# Patient Record
Sex: MALE | Race: WHITE | Employment: FULL TIME | URBAN - NONMETROPOLITAN AREA
[De-identification: names, ages, dates, MRNs, and addresses within clinical notes are randomized per-mention and may not be internally consistent; named-entity substitution may affect disease eponyms.]

---

## 2020-06-05 ENCOUNTER — HOSPITAL ENCOUNTER (INPATIENT)
Age: 70
LOS: 14 days | Discharge: HOME | DRG: 885 | End: 2020-06-19
Attending: PSYCHIATRY & NEUROLOGY | Admitting: PSYCHIATRY & NEUROLOGY
Payer: MEDICARE

## 2020-06-05 PROBLEM — F33.9 MDD (MAJOR DEPRESSIVE DISORDER), RECURRENT EPISODE, WITH ATYPICAL FEATURES (HCC): Status: ACTIVE | Noted: 2020-06-05

## 2020-06-05 LAB
ACETAMINOPHEN LEVEL: < 5 UG/ML (ref 0–20)
ALBUMIN SERPL-MCNC: 4.6 G/DL (ref 3.5–5.1)
ALP BLD-CCNC: 65 U/L (ref 38–126)
ALT SERPL-CCNC: 25 U/L (ref 11–66)
AMPHETAMINE+METHAMPHETAMINE URINE SCREEN: POSITIVE
ANION GAP SERPL CALCULATED.3IONS-SCNC: 11 MEQ/L (ref 8–16)
AST SERPL-CCNC: 20 U/L (ref 5–40)
BARBITURATE QUANTITATIVE URINE: NEGATIVE
BASOPHILS # BLD: 0.2 %
BASOPHILS ABSOLUTE: 0 THOU/MM3 (ref 0–0.1)
BENZODIAZEPINE QUANTITATIVE URINE: POSITIVE
BILIRUB SERPL-MCNC: 0.3 MG/DL (ref 0.3–1.2)
BUN BLDV-MCNC: 16 MG/DL (ref 7–22)
CALCIUM SERPL-MCNC: 9.9 MG/DL (ref 8.5–10.5)
CANNABINOID QUANTITATIVE URINE: NEGATIVE
CARBAMAZEPINE, TOTAL: < 2 MCG/ML (ref 2–10)
CHLORIDE BLD-SCNC: 102 MEQ/L (ref 98–111)
CO2: 25 MEQ/L (ref 23–33)
COCAINE METABOLITE QUANTITATIVE URINE: NEGATIVE
CREAT SERPL-MCNC: 0.9 MG/DL (ref 0.4–1.2)
EKG ATRIAL RATE: 79 BPM
EKG P AXIS: 61 DEGREES
EKG P-R INTERVAL: 174 MS
EKG Q-T INTERVAL: 362 MS
EKG QRS DURATION: 86 MS
EKG QTC CALCULATION (BAZETT): 415 MS
EKG R AXIS: 48 DEGREES
EKG T AXIS: 59 DEGREES
EKG VENTRICULAR RATE: 79 BPM
EOSINOPHIL # BLD: 0.5 %
EOSINOPHILS ABSOLUTE: 0 THOU/MM3 (ref 0–0.4)
ERYTHROCYTE [DISTWIDTH] IN BLOOD BY AUTOMATED COUNT: 13.1 % (ref 11.5–14.5)
ERYTHROCYTE [DISTWIDTH] IN BLOOD BY AUTOMATED COUNT: 44.1 FL (ref 35–45)
ETHYL ALCOHOL, SERUM: < 0.01 %
GFR SERPL CREATININE-BSD FRML MDRD: 84 ML/MIN/1.73M2
GLUCOSE BLD-MCNC: 119 MG/DL (ref 70–108)
HCT VFR BLD CALC: 50.6 % (ref 42–52)
HEMOGLOBIN: 16.7 GM/DL (ref 14–18)
IMMATURE GRANS (ABS): 0.01 THOU/MM3 (ref 0–0.07)
IMMATURE GRANULOCYTES: 0.2 %
LITHIUM LEVEL: < 0.05 MMOL/L (ref 0.6–1.2)
LYMPHOCYTES # BLD: 13 %
LYMPHOCYTES ABSOLUTE: 0.8 THOU/MM3 (ref 1–4.8)
MCH RBC QN AUTO: 30.3 PG (ref 26–33)
MCHC RBC AUTO-ENTMCNC: 33 GM/DL (ref 32.2–35.5)
MCV RBC AUTO: 91.8 FL (ref 80–94)
MONOCYTES # BLD: 10.8 %
MONOCYTES ABSOLUTE: 0.7 THOU/MM3 (ref 0.4–1.3)
NUCLEATED RED BLOOD CELLS: 0 /100 WBC
OPIATES, URINE: NEGATIVE
OSMOLALITY CALCULATION: 278 MOSMOL/KG (ref 275–300)
OXYCODONE: NEGATIVE
PHENCYCLIDINE QUANTITATIVE URINE: NEGATIVE
PLATELET # BLD: 283 THOU/MM3 (ref 130–400)
PMV BLD AUTO: 8.2 FL (ref 9.4–12.4)
POTASSIUM REFLEX MAGNESIUM: 4.2 MEQ/L (ref 3.5–5.2)
RBC # BLD: 5.51 MILL/MM3 (ref 4.7–6.1)
SALICYLATE, SERUM: < 0.3 MG/DL (ref 2–10)
SEG NEUTROPHILS: 75.3 %
SEGMENTED NEUTROPHILS ABSOLUTE COUNT: 4.7 THOU/MM3 (ref 1.8–7.7)
SODIUM BLD-SCNC: 138 MEQ/L (ref 135–145)
T4 FREE: 1.53 NG/DL (ref 0.93–1.76)
TOTAL PROTEIN: 7.2 G/DL (ref 6.1–8)
TROPONIN T: < 0.01 NG/ML
TSH SERPL DL<=0.05 MIU/L-ACNC: 0.63 UIU/ML (ref 0.4–4.2)
VALPROIC ACID LEVEL: < 2.8 UG/ML (ref 50–100)
WBC # BLD: 6.3 THOU/MM3 (ref 4.8–10.8)

## 2020-06-05 PROCEDURE — 84484 ASSAY OF TROPONIN QUANT: CPT

## 2020-06-05 PROCEDURE — 93005 ELECTROCARDIOGRAM TRACING: CPT | Performed by: NURSE PRACTITIONER

## 2020-06-05 PROCEDURE — G0480 DRUG TEST DEF 1-7 CLASSES: HCPCS

## 2020-06-05 PROCEDURE — 80053 COMPREHEN METABOLIC PANEL: CPT

## 2020-06-05 PROCEDURE — 85025 COMPLETE CBC W/AUTO DIFF WBC: CPT

## 2020-06-05 PROCEDURE — 6370000000 HC RX 637 (ALT 250 FOR IP): Performed by: PSYCHIATRY & NEUROLOGY

## 2020-06-05 PROCEDURE — 1240000000 HC EMOTIONAL WELLNESS R&B

## 2020-06-05 PROCEDURE — 80156 ASSAY CARBAMAZEPINE TOTAL: CPT

## 2020-06-05 PROCEDURE — 99284 EMERGENCY DEPT VISIT MOD MDM: CPT

## 2020-06-05 PROCEDURE — 80178 ASSAY OF LITHIUM: CPT

## 2020-06-05 PROCEDURE — 84443 ASSAY THYROID STIM HORMONE: CPT

## 2020-06-05 PROCEDURE — 80164 ASSAY DIPROPYLACETIC ACD TOT: CPT

## 2020-06-05 PROCEDURE — 36415 COLL VENOUS BLD VENIPUNCTURE: CPT

## 2020-06-05 PROCEDURE — 80307 DRUG TEST PRSMV CHEM ANLYZR: CPT

## 2020-06-05 PROCEDURE — 84439 ASSAY OF FREE THYROXINE: CPT

## 2020-06-05 RX ORDER — DEXTROAMPHETAMINE SACCHARATE, AMPHETAMINE ASPARTATE, DEXTROAMPHETAMINE SULFATE AND AMPHETAMINE SULFATE 2.5; 2.5; 2.5; 2.5 MG/1; MG/1; MG/1; MG/1
10 TABLET ORAL EVERY MORNING
Status: ON HOLD | COMMUNITY
End: 2020-06-19 | Stop reason: HOSPADM

## 2020-06-05 RX ORDER — ALPRAZOLAM 0.5 MG/1
TABLET ORAL 3 TIMES DAILY PRN
Status: ON HOLD | COMMUNITY
End: 2020-06-19 | Stop reason: SDUPTHER

## 2020-06-05 RX ORDER — TRAZODONE HYDROCHLORIDE 50 MG/1
50 TABLET ORAL NIGHTLY PRN
Status: DISCONTINUED | OUTPATIENT
Start: 2020-06-05 | End: 2020-06-08

## 2020-06-05 RX ORDER — ACETAMINOPHEN 325 MG/1
650 TABLET ORAL EVERY 4 HOURS PRN
Status: DISCONTINUED | OUTPATIENT
Start: 2020-06-05 | End: 2020-06-05

## 2020-06-05 RX ORDER — MAGNESIUM HYDROXIDE/ALUMINUM HYDROXICE/SIMETHICONE 120; 1200; 1200 MG/30ML; MG/30ML; MG/30ML
30 SUSPENSION ORAL EVERY 6 HOURS PRN
Status: DISCONTINUED | OUTPATIENT
Start: 2020-06-05 | End: 2020-06-19 | Stop reason: HOSPADM

## 2020-06-05 RX ORDER — IBUPROFEN 400 MG/1
400 TABLET ORAL EVERY 6 HOURS PRN
Status: DISCONTINUED | OUTPATIENT
Start: 2020-06-05 | End: 2020-06-19 | Stop reason: HOSPADM

## 2020-06-05 RX ORDER — ACETAMINOPHEN 325 MG/1
650 TABLET ORAL EVERY 4 HOURS PRN
Status: DISCONTINUED | OUTPATIENT
Start: 2020-06-05 | End: 2020-06-19 | Stop reason: HOSPADM

## 2020-06-05 RX ORDER — LISINOPRIL 20 MG/1
20 TABLET ORAL DAILY
Status: DISCONTINUED | OUTPATIENT
Start: 2020-06-06 | End: 2020-06-19 | Stop reason: HOSPADM

## 2020-06-05 RX ORDER — LISINOPRIL 20 MG/1
20 TABLET ORAL DAILY
COMMUNITY

## 2020-06-05 RX ORDER — ALPRAZOLAM 0.5 MG/1
0.5 TABLET ORAL 3 TIMES DAILY PRN
Status: DISCONTINUED | OUTPATIENT
Start: 2020-06-05 | End: 2020-06-19 | Stop reason: HOSPADM

## 2020-06-05 RX ORDER — ALPRAZOLAM 0.25 MG/1
0.25 TABLET ORAL ONCE
Status: COMPLETED | OUTPATIENT
Start: 2020-06-05 | End: 2020-06-05

## 2020-06-05 RX ORDER — IBUPROFEN 200 MG
400 TABLET ORAL EVERY 6 HOURS PRN
Status: DISCONTINUED | OUTPATIENT
Start: 2020-06-05 | End: 2020-06-05

## 2020-06-05 RX ORDER — DEXTROAMPHETAMINE SACCHARATE, AMPHETAMINE ASPARTATE, DEXTROAMPHETAMINE SULFATE AND AMPHETAMINE SULFATE 5; 5; 5; 5 MG/1; MG/1; MG/1; MG/1
20 TABLET ORAL
Status: ON HOLD | COMMUNITY
End: 2020-06-19 | Stop reason: HOSPADM

## 2020-06-05 RX ORDER — POLYETHYLENE GLYCOL 3350 17 G/17G
17 POWDER, FOR SOLUTION ORAL DAILY PRN
Status: DISCONTINUED | OUTPATIENT
Start: 2020-06-05 | End: 2020-06-19 | Stop reason: HOSPADM

## 2020-06-05 RX ORDER — HYDROXYZINE HYDROCHLORIDE 25 MG/1
50 TABLET, FILM COATED ORAL 3 TIMES DAILY PRN
Status: DISCONTINUED | OUTPATIENT
Start: 2020-06-05 | End: 2020-06-19 | Stop reason: HOSPADM

## 2020-06-05 RX ADMIN — ALPRAZOLAM 0.5 MG: 0.5 TABLET ORAL at 22:09

## 2020-06-05 RX ADMIN — ALPRAZOLAM 0.25 MG: 0.25 TABLET ORAL at 19:57

## 2020-06-05 RX ADMIN — TRAZODONE HYDROCHLORIDE 50 MG: 50 TABLET ORAL at 22:09

## 2020-06-05 ASSESSMENT — ENCOUNTER SYMPTOMS
CHEST TIGHTNESS: 0
PHOTOPHOBIA: 0
DIARRHEA: 0
SINUS PAIN: 0
APNEA: 0
RHINORRHEA: 0
CONSTIPATION: 0
SINUS PRESSURE: 0
TROUBLE SWALLOWING: 0
FACIAL SWELLING: 0
COUGH: 0
COLOR CHANGE: 0
VOMITING: 0
BACK PAIN: 0
SORE THROAT: 0
SHORTNESS OF BREATH: 0
WHEEZING: 0
ABDOMINAL PAIN: 0
NAUSEA: 0
ABDOMINAL DISTENTION: 0

## 2020-06-05 ASSESSMENT — SLEEP AND FATIGUE QUESTIONNAIRES
DIFFICULTY STAYING ASLEEP: YES
RESTFUL SLEEP: NO
AVERAGE NUMBER OF SLEEP HOURS: 2
DO YOU USE A SLEEP AID: NO
DO YOU HAVE DIFFICULTY SLEEPING: YES
DO YOU HAVE DIFFICULTY SLEEPING: YES
DIFFICULTY ARISING: NO
DO YOU USE A SLEEP AID: NO
DIFFICULTY FALLING ASLEEP: YES

## 2020-06-05 ASSESSMENT — LIFESTYLE VARIABLES: HISTORY_ALCOHOL_USE: YES

## 2020-06-05 ASSESSMENT — PATIENT HEALTH QUESTIONNAIRE - PHQ9: SUM OF ALL RESPONSES TO PHQ QUESTIONS 1-9: 20

## 2020-06-05 NOTE — ED NOTES
Pt to 4E via wheelchair with [place] police     Keely Sharpe [facility]  06/05/20 7987

## 2020-06-05 NOTE — ED PROVIDER NOTES
[address]          CHIEF COMPLAINT       Chief Complaint   Patient presents with    Depression       Nurses Notes reviewed and I agree except as noted in the HPI. HISTORY OF PRESENT ILLNESS    Remi Mancilla is a 81 y.o. male who presents to the Emergency Department for the evaluation of severe depression. He arrives to the emergency department voluntarily accompanied by his wife. He is experiencing ongoing depression for the past 7 months. He denies thoughts of suicide or homicide however feels overwhelmingly depressed. .  States that he would never consider suicide due to the fact that it would have on his family. States he has been coping with depression for over 20 years however is normal able to rationalize through it and \"take it\". Patient has been evaluated via telehealth by Dr. Felipe Neff, medications have been prescribed however he states that they are not helping with his depression. Patient reports that yesterday evening he did have a panic attack that was so severe that he had to be restrained by his family members and this was very concerning to him and them. Depression is affecting everyday life, family life as well as patient is voicing concerns for effects on his work as he is a heavy . The HPI was provided by the patient. REVIEW OF SYSTEMS     Review of Systems   Constitutional: Negative for chills, diaphoresis, fatigue and fever. HENT: Negative for congestion, ear pain, facial swelling, rhinorrhea, sinus pressure, sinus pain, sneezing, sore throat and trouble swallowing. Eyes: Negative for photophobia. Respiratory: Negative for apnea, cough, chest tightness, shortness of breath and wheezing. Cardiovascular: Positive for palpitations. Negative for chest pain. Gastrointestinal: Negative for abdominal distention, abdominal pain, constipation, diarrhea, nausea and vomiting.    Endocrine: Negative for polydipsia, polyphagia and polyuria. Genitourinary: Negative for decreased urine volume, dysuria, flank pain, frequency and urgency. Musculoskeletal: Negative for arthralgias, back pain, joint swelling, myalgias, neck pain and neck stiffness. Skin: Negative for color change and wound. Neurological: Negative for dizziness, tremors, weakness, light-headedness, numbness and headaches. Psychiatric/Behavioral: Positive for sleep disturbance. Negative for confusion, decreased concentration, hallucinations, self-injury and suicidal ideas. The patient is nervous/anxious. PAST MEDICAL HISTORY    has a past medical history of Anxiety, Depression, Hypertension, Low testosterone, and Psychiatric problem. SURGICAL HISTORY      has a past surgical history that includes Appendectomy; Knee arthroscopy (Left); and hernia repair. CURRENT MEDICATIONS       Current Discharge Medication List      CONTINUE these medications which have NOT CHANGED    Details   amphetamine-dextroamphetamine (ADDERALL) 10 MG tablet Take 10 mg by mouth every morning. amphetamine-dextroamphetamine (ADDERALL) 20 MG tablet Take 20 mg by mouth Daily with lunch. lisinopril (PRINIVIL;ZESTRIL) 20 MG tablet Take 20 mg by mouth daily      ALPRAZolam (XANAX) 0.5 MG tablet Take by mouth 3 times daily as needed for Anxiety. Unsure of dose      Testosterone Cypionate (TESTONE CIK IM) Inject into the muscle every 30 days Unsure of dose. ALLERGIES     is allergic to 5-alpha reductase inhibitors. FAMILY HISTORY     He indicated that his mother is . He indicated that his father is . He indicated that the status of his paternal grandmother is unknown. He indicated that the status of his paternal aunt is unknown. He indicated that the status of his paternal uncle is unknown. He indicated that the status of his paternal cousin is unknown.   family history includes Alzheimer's Disease in his mother;  Anxiety Disorder the radiologist.    No orders to display       LABS:     Labs Reviewed   CBC WITH AUTO DIFFERENTIAL - Abnormal; Notable for the following components:       Result Value    MPV 8.2 (*)     Lymphocytes Absolute 0.8 (*)     All other components within normal limits   COMPREHENSIVE METABOLIC PANEL W/ REFLEX TO MG FOR LOW K - Abnormal; Notable for the following components:    Glucose 119 (*)     All other components within normal limits   SALICYLATE LEVEL - Abnormal; Notable for the following components:    Salicylate, Serum < 0.3 (*)     All other components within normal limits   GLOMERULAR FILTRATION RATE, ESTIMATED - Abnormal; Notable for the following components:    Est, Glom Filt Rate 84 (*)     All other components within normal limits   LITHIUM LEVEL - Abnormal; Notable for the following components:    Lithium Lvl < 0.05 (*)     All other components within normal limits   VALPROIC ACID LEVEL, TOTAL - Abnormal; Notable for the following components:    Valproic Acid Lvl < 2.8 (*)     All other components within normal limits   LITHIUM LEVEL - Abnormal; Notable for the following components:    Lithium Lvl 0.42 (*)     All other components within normal limits   ACETAMINOPHEN LEVEL   ETHANOL   URINE DRUG SCREEN   TROPONIN   ANION GAP   OSMOLALITY   TSH WITHOUT REFLEX   T4, FREE   CARBAMAZEPINE LEVEL, TOTAL       EMERGENCY DEPARTMENT COURSE:   Vitals:    Vitals:    06/11/20 1748 06/11/20 2000 06/12/20 0755 06/12/20 0940   BP: 111/64 117/61 100/62 125/65   Pulse: 69 72 60 61   Resp: 16 16 16    Temp: 97.8 °F (36.6 °C) 96.8 °F (36 °C) 96 °F (35.6 °C)    TempSrc: Oral Oral Oral    SpO2: 96% 98% 98%    Weight:       Height:           5:21 PM EDT: The patient was seen and evaluated. MDM:  Patient seen and evaluated for depression without suicidal or homicidal ideation. Patient is here voluntarily. Appropriate labs were ordered for medical clearance.   EKG completed due to patient complaining of \"heart pounding\"

## 2020-06-05 NOTE — PROGRESS NOTES
Provisional Diagnosis:   Major Depressive Disorder per patient     Risk, Psychosocial and Contextual Factors:  Anxiety    Current  Treatment: Dr. Tammy Jauregui at [facility]      Present Suicidal Behavior:      Verbal: Denied        Attempt: Denied    Access to Weapons:  Patient's wife states guns are secured. Current Suicide Risk: Low, Moderate or High:  Low      Past Suicidal Behavior:       Verbal: Denied    Attempt: None noted    Self-Injurious/Self-Mutilation: None noted    Traumatic Event Within Past 2 Weeks:   Denied    Current Abuse: None noted    Legal: Denied    Violence: Denied    Protective Factors:  Patient's support system    Housing:   Lives with his wife     CPAP/Oxygen/Ambulation Difficulties: Denied    Basic Vital Signs Normal?: Check with Patients Nurse prior to Calling Psychiatry    Critical Labs?: Check with Patients Nurse prior to Calling Psychiatry    Clinical Summary:      Patient is a 81year old male who presents to the ED voluntarily. Patient is accompanied by his wife Omayra Briscoe. Patient states he has been a client of Dr. Tammy Jauregui since this year. Patient states he has not been sleeping for months and had panic attacks the past two days. Patient states his heart was racing and his family had to hold him down last night. Patient states he has lost 24 pounds since November. Patient states feeling anxious, nervous, racing thoughts, can't concentrate and unable to complete tasks he used to complete such as driving. Suicidal and Homicidal thoughts and/or plans denied. No delusions noted. Hallucinations denied. AOD denied. Omayra Briscoe did report that the patient did make a comment such as \"If there would of been a gun. .. \". Patient denied any intention of wanting to commit suicide. Patient states he stopped the psychotropics Dr. Tammy Jauregui prescribed two months due to no relief. Patient states he does not know what he will do if discharged. Patient was tearful but cooperative.     Level of Care Disposition:

## 2020-06-06 PROBLEM — F33.2 MDD (MAJOR DEPRESSIVE DISORDER), RECURRENT SEVERE, WITHOUT PSYCHOSIS (HCC): Chronic | Status: ACTIVE | Noted: 2020-06-05

## 2020-06-06 PROBLEM — F42.9 OCD (OBSESSIVE COMPULSIVE DISORDER): Chronic | Status: ACTIVE | Noted: 2020-06-06

## 2020-06-06 PROBLEM — F41.0 PANIC DISORDER WITHOUT AGORAPHOBIA: Chronic | Status: ACTIVE | Noted: 2020-06-06

## 2020-06-06 PROCEDURE — 6370000000 HC RX 637 (ALT 250 FOR IP): Performed by: PSYCHIATRY & NEUROLOGY

## 2020-06-06 PROCEDURE — 1240000000 HC EMOTIONAL WELLNESS R&B

## 2020-06-06 RX ORDER — FLUVOXAMINE MALEATE 50 MG/1
50 TABLET, COATED ORAL NIGHTLY
Status: DISCONTINUED | OUTPATIENT
Start: 2020-06-06 | End: 2020-06-08

## 2020-06-06 RX ORDER — LITHIUM CARBONATE 300 MG/1
300 TABLET, FILM COATED, EXTENDED RELEASE ORAL EVERY 12 HOURS SCHEDULED
Status: DISCONTINUED | OUTPATIENT
Start: 2020-06-06 | End: 2020-06-19 | Stop reason: HOSPADM

## 2020-06-06 RX ADMIN — FLUVOXAMINE MALEATE 50 MG: 50 TABLET, COATED ORAL at 22:11

## 2020-06-06 RX ADMIN — LITHIUM CARBONATE 300 MG: 300 TABLET, EXTENDED RELEASE ORAL at 12:05

## 2020-06-06 RX ADMIN — LITHIUM CARBONATE 300 MG: 300 TABLET, EXTENDED RELEASE ORAL at 22:11

## 2020-06-06 RX ADMIN — TRAZODONE HYDROCHLORIDE 50 MG: 50 TABLET ORAL at 22:12

## 2020-06-06 RX ADMIN — LISINOPRIL 20 MG: 20 TABLET ORAL at 08:32

## 2020-06-06 RX ADMIN — ALPRAZOLAM 0.5 MG: 0.5 TABLET ORAL at 20:02

## 2020-06-06 ASSESSMENT — SLEEP AND FATIGUE QUESTIONNAIRES
DIFFICULTY STAYING ASLEEP: YES
DO YOU HAVE DIFFICULTY SLEEPING: YES
DIFFICULTY ARISING: NO
DIFFICULTY FALLING ASLEEP: YES
RESTFUL SLEEP: NO
DO YOU USE A SLEEP AID: NO
SLEEP PATTERN: DIFFICULTY FALLING ASLEEP;DISTURBED/INTERRUPTED SLEEP;RESTLESSNESS;INSOMNIA

## 2020-06-06 ASSESSMENT — PATIENT HEALTH QUESTIONNAIRE - PHQ9: SUM OF ALL RESPONSES TO PHQ QUESTIONS 1-9: 20

## 2020-06-06 ASSESSMENT — LIFESTYLE VARIABLES: HISTORY_ALCOHOL_USE: YES

## 2020-06-06 NOTE — PLAN OF CARE
Problem: Altered Mood, Depressive Behavior:  Goal: Able to verbalize and/or display a decrease in depressive symptoms  Description: Able to verbalize and/or display a decrease in depressive symptoms  Outcome: Ongoing  Note: Patient able to verbalize and display a decrease in depressive symptoms   Goal: Absence of self-harm  Description: Absence of self-harm  Outcome: Ongoing  Note: No self harm behaviors were observed or reported so far this shift. Remains on every 15 minutes precautions for safety. Goal: Patient specific goal  Description: Patient specific goal  Outcome: Ongoing  Note: Patient stated specific  goal   Goal: Participates in care planning  Description: Participates in care planning  Outcome: Ongoing  Note: Patient did participate in care planning      Problem: Altered Mood, Psychotic Behavior:  Goal: Able to verbalize reality based thinking  Description: Able to verbalize reality based thinking  Outcome: Ongoing  Note: Patient able to verbalize reality based thinking      Problem: Anxiety:  Goal: Level of anxiety will decrease  Description: Level of anxiety will decrease  Outcome: Ongoing  Note: Patient level of anxiety will decrease with medication      Problem: Activity:  Goal: Sleeping patterns will improve  Description: Sleeping patterns will improve  Outcome: Ongoing  Note: Patient sleeping pattern will improve with medication      Problem: Discharge Planning:  Goal: Discharged to appropriate level of care  Description: Discharged to appropriate level of care  Outcome: Ongoing  Note: Discharge planner working with patient to achieve optimal discharge plan, specific to the needs of this patient.        Problem: KNOWLEDGE DEFICIT,EDUCATION,DISCHARGE PLAN  Goal: Knowledge - personal safety  Outcome: Ongoing  Note: Patient did  not complete personal safety plan this shift      Problem: General Medical Problem-Behavioral Health  Goal: Vital signs within specified parameters  Outcome: Ongoing  Note:

## 2020-06-06 NOTE — PROGRESS NOTES
Behavioral Health   Admission Note     Admission Type:   Admission Type: Voluntary    Reason for admission:  Reason for Admission: \"I struggled with this anxiety for 7 months, nothing helps, I am going nowhere no matter what. \" \"I cannot sleep or enjoy my grandkids, I am scared to be around people or to do anything. \"    PATIENT STRENGTHS:  Strengths: Communication, Connection to output provider, Employment, Positive Support    Patient Strengths and Limitations:  Limitations: Difficulty problem solving/relies on others to help solve problems, Tendency to isolate self, Hopeless about future, General negative or hopeless attitude about future/recovery    Addictive Behavior:   Addictive Behavior  In the past 3 months, have you felt or has someone told you that you have a problem with:  : None  Do you have a history of Chemical Use?: No  Do you have a history of Alcohol Use?: Yes  Do you have a history of Street Drug Abuse?: No  Histroy of Prescripton Drug Abuse?: No    Medical Problems:   Past Medical History:   Diagnosis Date    Anxiety     Depression     Hypertension     Low testosterone     Psychiatric problem        Status EXAM:  Status and Exam  Normal: No  Facial Expression: Worried, Sad  Affect: Unstable  Level of Consciousness: Alert  Mood:Normal: No  Mood: Depressed, Anxious, Labile, Helpless, Sad, Despair, Ashamed/humiliated  Motor Activity:Normal: No  Motor Activity: Increased(pacing)  Interview Behavior: Cooperative  Preception: Anton to Person, Anton to Time, Anton to Place, Anton to Situation  Attention:Normal: No  Attention: Unable to Concentrate  Thought Processes: Flt.of Ideas, Tangential  Thought Content:Normal: No  Thought Content: Obsessions, Phobias, Preoccupations  Hallucinations: None  Delusions: Yes  Delusions: Obsessions, Other(See Comment)(somatic, fearful of being around people)  Memory:Normal: No  Memory: Poor Recent  Insight and Judgment: No  Insight and Judgment: Poor Judgment, Poor Insight, Unrealistic  Present Suicidal Ideation: No  Present Homicidal Ideation: No    Pt admitted with followings belongings:  Dentures: Partials  Vision - Corrective Lenses: Glasses  Hearing Aid: None  Jewelry: None  Body Piercings Removed: N/A  Clothing: Socks  Were All Patient Medications Collected?: Not Applicable  Other Valuables: None     Admission order obtained yes. Valuables sent home with no one in my presence. Valuables placed in safe in security envelope, number:  n/a. Patient's home medications were n/a. Patient oriented to surroundings and program expectations and copy of patient rights given. Received admission packet:  yes. Consents reviewed, signed yes. \"An Important Message from [employer] About Your Rights\" form reviewed, signed yes. Refused no. Patient verbalize understanding:  yes. Patient education on precautions: yes           Patient screened positive for suicide risk on CSSR-S (\"yes\" to question #4, 5, OR 6)  no. Physician notified of risk score. Orders received n/a . Explained patients right to have family, representative or physician notified of their admission. Patient has Requested for physician to be notified. Patient has Declined for family/representative to be notified. Patient on unit when writer arrived. Patient has reportedly been having panic attacks at home and not sleeping. Patient reports that for 7 months he has been anxious, depressed, not sleeping at all, and not able to work, drive or enjoy life and his grandchildren. Patient also reports that he is scared to be around people and he isolates. He is unable to give any specific trigger or stressor that has caused this but does say it happened to him 35 years ago and he was treated here by Dr. Drake Whitney. Patient also admits that he has not been taking his medications as Dr. Alexandro Prieto has prescribed for at least 2 weeks but has been taking adderall that his family Dr. Severo Pilar.  Wife verified medications per phone and

## 2020-06-06 NOTE — BH NOTE
INPATIENT RECREATIONAL THERAPY  ADULT BEHAVIORAL SERVICES  EVALUATION    REFERRING PHYSICIAN:  Dr. Isac Walker  DIAGNOSIS:   Major Depressive Disorder   PRECAUTIONS:  Standard Precautions   HISTORY OF PRESENT ILLNESS/INJURY: Pt admitted to the unit voluntarily due \"dealing something that happened 35 years ago\", and that it is coming back to haunt him. Pt reports he has positive support with family, but that he has been having increased anxiety and has been isolating. He reports that he cannot enjoy being around his grandchildren or participating in any recreational activities. Pt reports that he has had racing thoughts, has difficulty concentrating, and is unable to complete tasks effectively. Pt is pleasant and cooperative during group therapy sessions and on the unit. Pt reports that he is hard of hearing, and that he has ringing ears. Pt reports that he does have hearing aids but he does not wear them. PMH:  Please see medical chart for prior medical history, allergies, and medication    HISTORY OF PSYCHIATRIC TREATMENT: Per documentation pt is treated at [facility] by Dr. Elina Phlilips and has been a pt of Dr. Gali Martínez since this year. Pt reports he was not taking medications as prescribed for the last two weeks, and he has seen Dr. Isac Walker in the past.   Eribertoshahrzad Adamsonsen:  1950  GENDER:  Male   MARITAL STATUS:     EMPLOYMENT STATUS:  [employer]  LIVING SITUATION/SUPPORT:  Lives with spouse  EDUCATIONAL LEVEL:   MEDICATION/DRUG USE: Pt has a hx of medication non-compliance prior to admission. Pt denies illicit substance abuse but does report alcohol abuse. LEISURE INTERESTS:  Pt reports right now with anxiety he has not had motivation to participate in activities and has been isolating himself.  TV/movies, spending time with spouse and family, reading, walking   ACTIVITY PREFERENCE: Individual, isolating due to anxiety  ACTIVITY TYPES:  Indoor, Outdoor, Active, Passive  COGNITION: A&OX4    COPING: Poor   ATTENTION: Fair
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
## 2020-06-06 NOTE — PROGRESS NOTES
This RN has reviewed and agrees with YAMILA Jin LPN's data collection and has collaborated with this LPN regarding the patient's care plan.

## 2020-06-06 NOTE — GROUP NOTE
Group Therapy Note    Date: 6/6/2020    Group Start Time: 1000  Group End Time: 1030  Group Topic: [address] Adult Psych 4E    Shaye De La Cruz, CTRS        Group Therapy Note    Attendees: 6         Patient's Goal:  To become the man I used to be. Notes:  Pt engaging in conversation with prompting and reports that he feels he used to be a vibrant and outgoing person, but within the last few months he has lacked motivation and has been isolated. Status After Intervention:  Improved    Participation Level:  Active Listener and Interactive    Participation Quality: Appropriate, Attentive and Sharing      Speech:  normal      Thought Process/Content: Logical      Affective Functioning: Flat      Mood: euthymic      Level of consciousness:  Alert, Oriented x4 and Attentive      Response to Learning: Able to verbalize current knowledge/experience, Able to retain information and Progressing to goal      Endings: None Reported    Modes of Intervention: Education, Support, Socialization, Exploration, Clarifying, Activity, Limit-setting and Reality-testing      Discipline Responsible: Psychoeducational Specialist      Signature:  Radha Zaidi

## 2020-06-07 PROCEDURE — 1240000000 HC EMOTIONAL WELLNESS R&B

## 2020-06-07 PROCEDURE — 6370000000 HC RX 637 (ALT 250 FOR IP): Performed by: PSYCHIATRY & NEUROLOGY

## 2020-06-07 RX ADMIN — TRAZODONE HYDROCHLORIDE 50 MG: 50 TABLET ORAL at 21:51

## 2020-06-07 RX ADMIN — LISINOPRIL 20 MG: 20 TABLET ORAL at 08:45

## 2020-06-07 RX ADMIN — FLUVOXAMINE MALEATE 50 MG: 50 TABLET, COATED ORAL at 21:51

## 2020-06-07 RX ADMIN — HYDROXYZINE HYDROCHLORIDE 50 MG: 25 TABLET ORAL at 10:12

## 2020-06-07 RX ADMIN — LITHIUM CARBONATE 300 MG: 300 TABLET, EXTENDED RELEASE ORAL at 08:45

## 2020-06-07 RX ADMIN — LITHIUM CARBONATE 300 MG: 300 TABLET, EXTENDED RELEASE ORAL at 21:51

## 2020-06-07 ASSESSMENT — PAIN SCALES - GENERAL: PAINLEVEL_OUTOF10: 0

## 2020-06-07 NOTE — GROUP NOTE
Group Therapy Note    Date: 6/7/2020    Group Start Time: 1000  Group End Time: 0549  Group Topic: [address] Adult Psych 4E    [id number] [facility], [place]    Group Therapy Note    Attendees: 8         Pt did not attend 1000 goal group and community meeting. Pt received maximum encouragement from staff. Pt was offered 1:1 session.     Signature:  Malik San

## 2020-06-07 NOTE — GROUP NOTE
Group Therapy Note    Date: 6/7/2020    Group Start Time: 1100  Group End Time: 1200  Group Topic: Recreational    [facility] Adult Psych 4E    MYKEL Dodge    Group Therapy Note    Attendees: 7         Patient's Goal:  Pt will improve socialization and knowledge of coping skills through participation of recreation therapy game group session with a focus on using games as a coping mechanism to deal with stressors. Notes:  Pt was present in group. Pt was cooperative and pleasant and had active participation in [address] [facility]. Status After Intervention:  Improved    Participation Level:  Active Listener and Interactive    Participation Quality: Appropriate and Attentive      Speech:  normal      Thought Process/Content: Logical      Affective Functioning: Flat      Mood: euthymic      Level of consciousness:  Alert, Oriented x4 and Attentive      Response to Learning: Able to verbalize current knowledge/experience, Able to verbalize/acknowledge new learning, Able to retain information, Capable of insight, Able to change behavior and Progressing to goal      Endings: None Reported    Modes of Intervention: Education, Support, Socialization, Exploration, Clarifying, Problem-solving and Activity      Discipline Responsible: Psychoeducational Specialist      Signature:  MYEKL Callahan

## 2020-06-07 NOTE — PLAN OF CARE
Problem: Altered Mood, Depressive Behavior:  Goal: Able to verbalize and/or display a decrease in depressive symptoms  Description: Able to verbalize and/or display a decrease in depressive symptoms  Outcome: Ongoing  Note: Patient able to verbalize a decrease in depressive symptoms   Goal: Absence of self-harm  Description: Absence of self-harm  Outcome: Ongoing  Note: No self harm behaviors were observed or reported so far this shift. Remains on every 15 minutes precautions for safety. Goal: Patient specific goal  Description: Patient specific goal  Outcome: Ongoing  Note: Patient did state specific goal this shift  Goal: Participates in care planning  Description: Participates in care planning  Outcome: Ongoing  Note: Patient did participate in care planning      Problem: Altered Mood, Psychotic Behavior:  Goal: Able to verbalize reality based thinking  Description: Able to verbalize reality based thinking  Outcome: Ongoing  Note: Patient able to verbalize reality based thinking      Problem: Anxiety:  Goal: Level of anxiety will decrease  Description: Level of anxiety will decrease  Outcome: Ongoing  Note: Patient level of anxiety decreased with medication      Problem: Activity:  Goal: Sleeping patterns will improve  Description: Sleeping patterns will improve  Outcome: Ongoing  Note: Patient sleeping pattern will improve with medication      Problem: Discharge Planning:  Goal: Discharged to appropriate level of care  Description: Discharged to appropriate level of care  Outcome: Ongoing  Note: Discharge planner working with patient to achieve optimal discharge plan, specific to the needs of this patient.        Problem: KNOWLEDGE DEFICIT,EDUCATION,DISCHARGE PLAN  Goal: Knowledge - personal safety  Outcome: Ongoing  Note: Patient did not complete personal safety plan this shift      Problem: General Medical Problem-Behavioral Health  Goal: Vital signs within specified parameters  Outcome: Ongoing  Note:

## 2020-06-07 NOTE — PATIENT CARE CONFERENCE
[facility]  Initial Interdisciplinary Treatment Plan NOTE    Review Date & Time: 6/6/2020 9:00 AM    Patient was in treatment team.  See Multidisciplinary Treatment Team sheet for participants. Admission Type:   Admission Type: Voluntary    Reason for admission:  Reason for Admission: \"I struggled with this anxiety for 7 months, nothing helps, I am going nowhere no matter what. \" \"I cannot sleep or enjoy my grandkids, I am scared to be around people or to do anything. \"      Estimated Length of Stay Update:  3-5 days   Estimated Discharge Date Update: 3-5 days     PATIENT STRENGTHS:  Patient Strengths Strengths: Employment, Positive Support, Communication, Connection to output provider  Patient Strengths and Limitations:Limitations: Difficulty problem solving/relies on others to help solve problems, Tendency to isolate self, Hopeless about future, General negative or hopeless attitude about future/recovery  Addictive Behavior:Addictive Behavior  In the past 3 months, have you felt or has someone told you that you have a problem with:  : None  Do you have a history of Chemical Use?: No  Do you have a history of Alcohol Use?: Yes  Do you have a history of Street Drug Abuse?: No  Histroy of Prescripton Drug Abuse?: No  Medical Problems:  Past Medical History:   Diagnosis Date    Anxiety     Depression     Hypertension     Low testosterone     Psychiatric problem        EDUCATION:   Learner Progress Toward Treatment Goals: Reviewed results and recommendations of this team, Reviewed group plan and strategies, Reviewed signs, symptoms and risk of self harm and violent behavior and Reviewed goals and plan of care    Method: Individual    Outcome: Verbalized understanding and Demonstrated Understanding    PATIENT GOALS: Get back to who I was and find something that works. PLAN/TREATMENT RECOMMENDATIONS UPDATE:   1. What is the most important thing we can help you with while you are here?  See above
Flt.of Ideas, Tangential  Thought Content:Normal: No  Thought Content: Compulsions, Preoccupations, Obsessions, Paranoia, Phobias  Hallucinations: None  Delusions: No  Delusions: Obsessions, Persecution  Memory:Normal: No  Memory: Poor Recent  Insight and Judgment: No  Insight and Judgment: Poor Judgment, Poor Insight, Unrealistic  Present Suicidal Ideation: No  Present Homicidal Ideation: No    Daily Assessment Last Entry:   Daily Sleep (WDL): Within Defined Limits         Patient Currently in Pain: Denies  Daily Nutrition (WDL): Within Defined Limits    Patient Monitoring:  Frequency of Checks: 4 times per hour, close    Psychiatric Symptoms:   Depression Symptoms  Depression Symptoms: Feelings of worthlessness, Feelings of hopelessess, Impaired concentration, Crying, Feelings of helplessness, Loss of interest  Anxiety Symptoms  Anxiety Symptoms: Generalized, Social phobias, Obsessions, Compulsive, Palpitations  Alma Delia Symptoms  Alma Delia Symptoms: No problems reported or observed. Psychosis Symptoms  Delusion Type: Somatic, Paranoid    Suicide Risk CSSR-S:  1) Within the past month, have you wished you were dead or wished you could go to sleep and not wake up? : No  2) Have you actually had any thoughts of killing yourself? : No  6) Have you ever done anything, started to do anything, or prepared to do anything to end your life?: No        EDUCATION:   Learner Progress Toward Treatment Goals: Reviewed results and recommendations of this team, Reviewed group plan and strategies, Reviewed signs, symptoms and risk of self harm and violent behavior and Reviewed goals and plan of care    Method: Individual    Outcome: Verbalized understanding, Demonstrated Understanding and Needs reinforcement    PATIENT GOALS Get back to who I was ans find something that works     PLAN/TREATMENT RECOMMENDATIONS UPDATE:  1. How are you progressing toward meeting your main treatment goal? I am not feeling better.  I just feel stuck and

## 2020-06-07 NOTE — PLAN OF CARE
Pt attended 1/3 groups that have been offered on the unit so far this shift. Pt has not been out of his room interacting with peers, other than while in group. Pt will continue to work towards attending all the groups offered on the unit and socializing with peers. Pt will continue to work towards socialization goal with ongoing progress.

## 2020-06-07 NOTE — PROGRESS NOTES
11.5 - 14.5 % Final    RDW-SD 06/05/2020 44.1  35.0 - 45.0 fL Final    Platelets 38/59/6662 283  130 - 400 thou/mm3 Final    MPV 06/05/2020 8.2* 9.4 - 12.4 fL Final    Seg Neutrophils 06/05/2020 75.3  % Final    Lymphocytes 06/05/2020 13.0  % Final    Monocytes 06/05/2020 10.8  % Final    Eosinophils 06/05/2020 0.5  % Final    Basophils 06/05/2020 0.2  % Final    Immature Granulocytes 06/05/2020 0.2  % Final    Segs Absolute 06/05/2020 4.7  1.8 - 7.7 thou/mm3 Final    Lymphocytes Absolute 06/05/2020 0.8* 1.0 - 4.8 thou/mm3 Final    Monocytes Absolute 06/05/2020 0.7  0.4 - 1.3 thou/mm3 Final    Eosinophils Absolute 06/05/2020 0.0  0.0 - 0.4 thou/mm3 Final    Basophils Absolute 06/05/2020 0.0  0.0 - 0.1 thou/mm3 Final    Immature Grans (Abs) 06/05/2020 0.01  0.00 - 0.07 thou/mm3 Final    nRBC 06/05/2020 0  /100 wbc Final    Performed at [facility], [address]    Glucose 06/05/2020 119* 70 - 108 mg/dL Final    CREATININE 06/05/2020 0.9  0.4 - 1.2 mg/dL Final    BUN 06/05/2020 16  7 - 22 mg/dL Final    Sodium 06/05/2020 138  135 - 145 meq/L Final    Potassium reflex Magnesium 06/05/2020 4.2  3.5 - 5.2 meq/L Final    Chloride 06/05/2020 102  98 - 111 meq/L Final    CO2 06/05/2020 25  23 - 33 meq/L Final    Calcium 06/05/2020 9.9  8.5 - 10.5 mg/dL Final    AST 06/05/2020 20  5 - 40 U/L Final    Alkaline Phosphatase 06/05/2020 65  38 - 126 U/L Final    Total Protein 06/05/2020 7.2  6.1 - 8.0 g/dL Final    Alb 06/05/2020 4.6  3.5 - 5.1 g/dL Final    Total Bilirubin 06/05/2020 0.3  0.3 - 1.2 mg/dL Final    ALT 06/05/2020 25  11 - 66 U/L Final    Performed at [facility], [address]    ETHYL ALCOHOL, SERUM 06/05/2020 < 0.01  0.00 % Final    Performed at [facility], [address]    AMPHETAMINE+METHAMPHETAMINE URINE * 06/05/2020 POSITIVE  NEGATIVE Final    Barbiturate Quant, Ur 06/05/2020 Negative Consistent with myocardial damage    Cardiac troponin values can be elevated by many disease states in addition  to acute ischemia. These include, but are not limited to: chronic renal  failure, CHF, CVA, pulmonary embolus, COPD, myocardial trauma/surgery,  myocarditis, pericarditis, tachycardia, aortic dissection, amyloidosis,  sepsis and strenuous exercise. Serial measurement of troponin is strongly  recommended as a first step in determining whether a low level elevation  represents an acute or chronic condition. Performed at [address] [place] [address], [address]      Anion Gap 06/05/2020 11.0  8.0 - 16.0 meq/L Final    Comment: ANION GAP = Sodium -(Chloride + CO2)  Performed at [address] [place] [address], [address]     Shaquille Elizondo Filt Rate 06/05/2020 84* ml/min/1.73m2 Final    Comment: Stage Description                    GFR, ml/min/1.73 m2   -   At increased risk               > or = 60 (with chronic                                       kidney disease risk factors)   1   Normal or increased GFR         > or = 90   2   Mildly or decreased GFR         60 - 89   3   Moderately decreased GFR        30 - 59   4   Severely decreased GFR          15 - 29   5   Kidney failure                  <15 (or dialysis)  Estimated GFR calculated using abbreviated MDRD formula as  recommended by [employer]. Calculation based  upon serum creatinine and adjusted for age, gender & race. Isabella. Internal Med., Vol. 139 (2) pg 137-147.   Performed at [address] [place] [address], [address]      Osmolality Calc 06/05/2020 278.0  275.0 - 300 mOsmol/kg Final    Performed at [address], [address]    TSH 06/05/2020 0.633  0.400 - 4.20 uIU/mL Final    Performed at [address], [address]    T4 Free 06/05/2020 1.53  0.93 - 1.76 ng/dL Final    Performed at [facility], [address]    Carbamazepine, Total 06/05/2020 < 2.0  2.0 - 10.0 mcg/ml Final    Comment: Many variables influence therapeutic and toxic ranges;  results should be  interpreted in conjunction with clinical status of patient. Performed at [address] [place] [address], [address]      Frazee Lvl 06/05/2020 < 0.05* 0.60 - 1.20 mmol/L Final    Comment: Many variables influence therapeutic and toxic ranges;  results should be  interpreted in conjunction with clinical status of patient. Performed at [facility], [address]      Valproic Acid Lvl 06/05/2020 < 2.8* 50.0 - 100.0 ug/mL Final    Comment: Many variables influence therapeutic and toxic ranges;  results should be  interpreted in conjunction with clinical status of patient. Performed at [address] [place] [address], [address]              Medications  Current Facility-Administered Medications: fluvoxaMINE (LUVOX) tablet 50 mg, 50 mg, Oral, Nightly  lithium (LITHOBID) extended release tablet 300 mg, 300 mg, Oral, 2 times per day  polyethylene glycol (GLYCOLAX) packet 17 g, 17 g, Oral, Daily PRN  acetaminophen (TYLENOL) tablet 650 mg, 650 mg, Oral, Q4H PRN  ibuprofen (ADVIL;MOTRIN) tablet 400 mg, 400 mg, Oral, Q6H PRN  magnesium hydroxide (MILK OF MAGNESIA) 400 MG/5ML suspension 30 mL, 30 mL, Oral, Daily PRN  aluminum & magnesium hydroxide-simethicone (MAALOX) 200-200-20 MG/5ML suspension 30 mL, 30 mL, Oral, Q6H PRN  traZODone (DESYREL) tablet 50 mg, 50 mg, Oral, Nightly PRN  hydrOXYzine (ATARAX) tablet 50 mg, 50 mg, Oral, TID PRN  ALPRAZolam (XANAX) tablet 0.5 mg, 0.5 mg, Oral, TID PRN  lisinopril (PRINIVIL;ZESTRIL) tablet 20 mg, 20 mg, Oral, Daily    ASSESSMENT  MDD (major depressive disorder), recurrent severe, without psychosis ([facility]     PLAN  Patient s symptoms   show no change  Continue with current medications. Attempt to develop insight  Psycho-education conducted.   Supportive Therapy

## 2020-06-08 PROCEDURE — 1240000000 HC EMOTIONAL WELLNESS R&B

## 2020-06-08 PROCEDURE — 6370000000 HC RX 637 (ALT 250 FOR IP): Performed by: PSYCHIATRY & NEUROLOGY

## 2020-06-08 RX ORDER — FLUVOXAMINE MALEATE 50 MG/1
100 TABLET, COATED ORAL NIGHTLY
Status: DISCONTINUED | OUTPATIENT
Start: 2020-06-08 | End: 2020-06-10

## 2020-06-08 RX ORDER — TRAZODONE HYDROCHLORIDE 100 MG/1
100 TABLET ORAL NIGHTLY PRN
Status: DISCONTINUED | OUTPATIENT
Start: 2020-06-08 | End: 2020-06-19 | Stop reason: HOSPADM

## 2020-06-08 RX ADMIN — LITHIUM CARBONATE 300 MG: 300 TABLET, EXTENDED RELEASE ORAL at 21:11

## 2020-06-08 RX ADMIN — ALPRAZOLAM 0.5 MG: 0.5 TABLET ORAL at 08:03

## 2020-06-08 RX ADMIN — TRAZODONE HYDROCHLORIDE 100 MG: 100 TABLET ORAL at 21:11

## 2020-06-08 RX ADMIN — LITHIUM CARBONATE 300 MG: 300 TABLET, EXTENDED RELEASE ORAL at 07:56

## 2020-06-08 RX ADMIN — FLUVOXAMINE MALEATE 100 MG: 50 TABLET, COATED ORAL at 21:11

## 2020-06-08 RX ADMIN — ALPRAZOLAM 0.5 MG: 0.5 TABLET ORAL at 21:11

## 2020-06-08 ASSESSMENT — PAIN SCALES - GENERAL
PAINLEVEL_OUTOF10: 0
PAINLEVEL_OUTOF10: 0

## 2020-06-08 NOTE — PLAN OF CARE
Problem: Altered Mood, Depressive Behavior:  Goal: Able to verbalize and/or display a decrease in depressive symptoms  Description: Able to verbalize and/or display a decrease in depressive symptoms  6/8/2020 1030 by Mario Summers RN  Outcome: Not Met This Shift  Note: Denies suicidal thoughts, hallucinations & delusions. Cooperative with meds & care. Visual checks made every 15 min. & prn. Safe, caring, secure & supportive environment provided. Mood & behavior assessed & documented. Describes mood as 2/10  6/8/2020 0221 by Amber Bonner RN  Outcome: Ongoing  Note: Patient reports depression, denies suicidal thoughts. Goal: Absence of self-harm  Description: Absence of self-harm  6/8/2020 1030 by Mario Summers RN  Outcome: Met This Shift  Note: No self harm behaviors were observed or reported so far this shift. Remains on every 15 minutes precautions for safety. 6/8/2020 0221 by Amber Bonner RN  Outcome: Ongoing  Note: Patient remains safe and free from harm. Goal: Patient specific goal  Description: Patient specific goal  6/8/2020 1030 by Mario Summers RN  Outcome: Ongoing  6/8/2020 0221 by Amber Bonner RN  Outcome: Ongoing  Note: Patient reports that his goal is progressing. Goal: Participates in care planning  Description: Participates in care planning  6/8/2020 1030 by Mario Summers RN  Outcome: Ongoing  Note: Discussed tx plan with patient. Encouraged to groups  Compliant with medications   6/8/2020 0221 by Amber Bonner RN  Outcome: Ongoing  Note: Patient participated this shift. Problem: Altered Mood, Psychotic Behavior:  Goal: Able to verbalize reality based thinking  Description: Able to verbalize reality based thinking  6/8/2020 1030 by Mario Summers RN  Outcome: Ongoing  Note: Alert and oriented x 4  6/8/2020 0221 by Amber Bonner RN  Outcome: Ongoing  Note: Patient is oriented, however is compulsive and reports racing thoughts.       Problem: Anxiety:  Goal: Level of anxiety will decrease  Description: Level of anxiety will decrease  6/8/2020 1030 by Darrell Whitehead RN  Outcome: Not Met This Shift  Note: Patient reports continue anxiety. Pt taking  Xanax prn. Patient remained anxious pacing until and then laid down and fell asleep  6/8/2020 0221 by Diaz Goldberg RN  Outcome: Ongoing  Note: Patient reports feeling better, declined xanax; some anxiety noted and reported. Patient voices worry over side-affects of medications. Problem: Activity:  Goal: Sleeping patterns will improve  Description: Sleeping patterns will improve  6/8/2020 1030 by Darrell Whitehead RN  Outcome: Ongoing  Note: States \"I didn't sleep well lat night\" Encouraged to inform staff when he is having difficulty sleeping. 6/8/2020 0221 by Diaz Goldberg RN  Outcome: Ongoing  Note: Patient reports \"false sleep\", however has been sleeping adequately at night per observation. Problem: Discharge Planning:  Goal: Discharged to appropriate level of care  Description: Discharged to appropriate level of care  6/8/2020 1030 by Darrell Whitehead RN  Outcome: Ongoing  Note: Plans to return home with wife and follow up at [address] [place] [address]  6/8/2020 0221 by Diaz Goldberg RN  Outcome: Ongoing  Note: Discharge planning is in progress. Problem: Nutrition  Goal: Optimal nutrition therapy  6/8/2020 1030 by Darrell Whitehead RN  Outcome: Ongoing  Note: Diet taken fair  6/8/2020 0914 by Bianac Uribe RD, LD  Outcome: Ongoing     Problem: KNOWLEDGE DEFICIT,EDUCATION,DISCHARGE PLAN  Goal: Knowledge - personal safety  6/8/2020 1030 by Darrell Whitehead RN  Outcome: Not Met This Shift  Note: Did not complete this shift  6/8/2020 0221 by Diaz Goldberg RN  Outcome: Ongoing  Note: Safety plan not completed this shift.       Problem: General Medical Problem-Behavioral Health  Goal: Vital signs within specified parameters  6/8/2020 1030 by Darrell Whitehead RN  Outcome: Ongoing  Note: Continue to monitor vital signs and chart  6/8/2020 0221 by Alize Marsh RN  Outcome: Ongoing  Note: Vital signs are stable. Care plan reviewed with patient . Patient  verbalize understanding of the plan of care and contribute to goal setting.

## 2020-06-08 NOTE — GROUP NOTE
Group Therapy Note    Date: 6/8/2020    Group Start Time: 1000  Group End Time: 4815  Group Topic: Recreational    [facility]    MYKEL Dodge    Group Therapy Note    Attendees: 9         Pt did not attend 1000 recreation therapy group session. Pt received maximum encouragement to attend group from staff. Handouts are available on the unit for pt to use independently.     Signature:  Milton Chavira

## 2020-06-08 NOTE — GROUP NOTE
Group Therapy Note    Date: 6/8/2020    Group Start Time: 0915  Group End Time: 0945  Group Topic: Community Meeting    [facility]    Dayami Benes, [address]    Group Therapy Note    Attendees: 8         Pt did not attend [phone] goal group and community meeting. Pt received maximum encouragement from staff to attend group. Pt did not participate in 1:1 session.     Signature:  Portia Guzman

## 2020-06-09 PROCEDURE — 1240000000 HC EMOTIONAL WELLNESS R&B

## 2020-06-09 PROCEDURE — 6370000000 HC RX 637 (ALT 250 FOR IP): Performed by: PSYCHIATRY & NEUROLOGY

## 2020-06-09 RX ADMIN — HYDROXYZINE HYDROCHLORIDE 50 MG: 25 TABLET ORAL at 12:07

## 2020-06-09 RX ADMIN — LITHIUM CARBONATE 300 MG: 300 TABLET, EXTENDED RELEASE ORAL at 08:47

## 2020-06-09 RX ADMIN — ALPRAZOLAM 0.5 MG: 0.5 TABLET ORAL at 20:50

## 2020-06-09 RX ADMIN — TRAZODONE HYDROCHLORIDE 100 MG: 100 TABLET ORAL at 20:50

## 2020-06-09 RX ADMIN — LITHIUM CARBONATE 300 MG: 300 TABLET, EXTENDED RELEASE ORAL at 20:50

## 2020-06-09 RX ADMIN — FLUVOXAMINE MALEATE 100 MG: 50 TABLET, COATED ORAL at 20:50

## 2020-06-09 ASSESSMENT — PAIN SCALES - GENERAL
PAINLEVEL_OUTOF10: 0
PAINLEVEL_OUTOF10: 0

## 2020-06-09 NOTE — PROGRESS NOTES
(ATARAX) tablet 50 mg, 50 mg, Oral, TID PRN  ALPRAZolam (XANAX) tablet 0.5 mg, 0.5 mg, Oral, TID PRN  lisinopril (PRINIVIL;ZESTRIL) tablet 20 mg, 20 mg, Oral, Daily    ASSESSMENT  MDD (major depressive disorder), recurrent severe, without psychosis ([facility]     PLAN  Patient s symptoms   show no change  Continue with current medications. Attempt to develop insight  Psycho-education conducted. Supportive Therapy conducted.

## 2020-06-09 NOTE — PLAN OF CARE
Problem: Altered Mood, Depressive Behavior:  Goal: Able to verbalize and/or display a decrease in depressive symptoms  Description: Able to verbalize and/or display a decrease in depressive symptoms  6/9/2020 1105 by Odalys Jolley RN  Outcome: Ongoing  Note: Pt afftect worried. 6/9/2020 0232 by Trinity Hansen RN  Outcome: Not Met This Shift  Note: States that he is depressed. Goal: Absence of self-harm  Description: Absence of self-harm  6/9/2020 1105 by Odalys Jolley RN  Outcome: Ongoing  Note: No self harm behaviors were observed or reported so far this shift. Remains on every 15 minutes precautions for safety. 6/9/2020 0232 by Trinity Hansen RN  Outcome: Met This Shift  Note: No self harm thoughts, plans, or behaviors this shift. Goal: Participates in care planning  Description: Participates in care planning  6/9/2020 1105 by Odalys Jolley RN  Outcome: Ongoing  Note: Patient discussed treatment plan with physician/medical staff, attending group, and compliant with medications. 6/9/2020 0232 by Trinity Hansen RN  Outcome: Met This Shift  Note: Care plan reviewed with patient. Patient verbalize understanding of the plan of care and contribute to goal setting. Problem: Altered Mood, Psychotic Behavior:  Goal: Able to verbalize reality based thinking  Description: Able to verbalize reality based thinking  6/9/2020 1105 by Odalys Jolley RN  Outcome: Ongoing  Note: Pt oeirnatated to all  6/9/2020 0232 by Trinity Hansen RN  Outcome: Not Met This Shift  Note: Continues to report obsessive, intrusive, racing thoughts this shift. Problem: Activity:  Goal: Sleeping patterns will improve  Description: Sleeping patterns will improve  6/9/2020 1105 by Odalys Jolley RN  Outcome: Ongoing  Note: Patient slept 8.5 hours last night, reports they didn't sleep well. Encourage patient not to take naps during the day, relax several hours before bed and to take prescribed sleep meds as ordered. Reports that he is very anxious. Took xanax this shift.

## 2020-06-09 NOTE — PLAN OF CARE
Took xanax this shift. Problem: KNOWLEDGE DEFICIT,EDUCATION,DISCHARGE PLAN  Goal: Knowledge - personal safety  Outcome: Not Met This Shift  Note: Did not complete a safety plan this shift.

## 2020-06-09 NOTE — PROGRESS NOTES
Psychotherapy group 1330-Pt encouraged to attend group but did not. Pt reports  High levels of anxiety and lack of motivation.

## 2020-06-10 LAB — LITHIUM LEVEL: 0.42 MMOL/L (ref 0.6–1.2)

## 2020-06-10 PROCEDURE — 6370000000 HC RX 637 (ALT 250 FOR IP): Performed by: PSYCHIATRY & NEUROLOGY

## 2020-06-10 PROCEDURE — 36415 COLL VENOUS BLD VENIPUNCTURE: CPT

## 2020-06-10 PROCEDURE — 1240000000 HC EMOTIONAL WELLNESS R&B

## 2020-06-10 PROCEDURE — 80178 ASSAY OF LITHIUM: CPT

## 2020-06-10 RX ORDER — FLUVOXAMINE MALEATE 50 MG/1
150 TABLET, COATED ORAL NIGHTLY
Status: DISCONTINUED | OUTPATIENT
Start: 2020-06-10 | End: 2020-06-19 | Stop reason: HOSPADM

## 2020-06-10 RX ADMIN — LISINOPRIL 20 MG: 20 TABLET ORAL at 09:43

## 2020-06-10 RX ADMIN — ALPRAZOLAM 0.5 MG: 0.5 TABLET ORAL at 16:44

## 2020-06-10 RX ADMIN — HYDROXYZINE HYDROCHLORIDE 50 MG: 25 TABLET ORAL at 09:43

## 2020-06-10 RX ADMIN — HYDROXYZINE HYDROCHLORIDE 50 MG: 25 TABLET ORAL at 21:49

## 2020-06-10 RX ADMIN — TRAZODONE HYDROCHLORIDE 100 MG: 100 TABLET ORAL at 21:49

## 2020-06-10 RX ADMIN — LITHIUM CARBONATE 300 MG: 300 TABLET, EXTENDED RELEASE ORAL at 09:43

## 2020-06-10 RX ADMIN — FLUVOXAMINE MALEATE 150 MG: 50 TABLET, COATED ORAL at 21:49

## 2020-06-10 RX ADMIN — LITHIUM CARBONATE 300 MG: 300 TABLET, EXTENDED RELEASE ORAL at 21:49

## 2020-06-10 ASSESSMENT — PAIN SCALES - GENERAL
PAINLEVEL_OUTOF10: 0
PAINLEVEL_OUTOF10: 0

## 2020-06-10 NOTE — PROGRESS NOTES
Tomeka Gavin is scheduled to follow up with Dr. Rafaela Bernstein at [facility] on 6/24/20 at 3:40 PM.     IDANIA Barr

## 2020-06-10 NOTE — PROGRESS NOTES
PRN  lisinopril (PRINIVIL;ZESTRIL) tablet 20 mg, 20 mg, Oral, Daily    ASSESSMENT  MD[facility]major depressive disorder), [facility]     PLAN  Patient s symptoms   show no change  Continue with current medications. Increase fluvoxamine. Attempt to develop insight  Psycho-education conducted. Supportive Therapy conducted.

## 2020-06-10 NOTE — PLAN OF CARE
DAVIDSON Flores  Outcome: Not Met This Shift  Note: Patient is obsessive and worrisome believing he won't get better. The patient has been tearful  and states he wants to have hope but at this time it is hard for him to be positive. The patient needs much reassurance and encouragement that the staff and his Doctor are working toward his being well. 6/9/2020 1105 by Milton Valenzuela RN  Outcome: Ongoing  Note: Pt afftect worried. Goal: Patient specific goal  Description: Patient specific goal  6/9/2020 2237 by Jacob Tello RN  Outcome: Not Met This Shift  Note: Patient did not establish a documented goal for today.   6/9/2020 1105 by Milton Valenzuela RN  Outcome: Not Met This Shift  Note: No goal set for today     Problem: Nutrition  Goal: Optimal nutrition therapy  6/9/2020 1105 by Milton Valenzuela RN  Outcome: Completed

## 2020-06-11 PROCEDURE — 6370000000 HC RX 637 (ALT 250 FOR IP): Performed by: PSYCHIATRY & NEUROLOGY

## 2020-06-11 PROCEDURE — 1240000000 HC EMOTIONAL WELLNESS R&B

## 2020-06-11 RX ADMIN — TRAZODONE HYDROCHLORIDE 100 MG: 100 TABLET ORAL at 21:08

## 2020-06-11 RX ADMIN — LITHIUM CARBONATE 300 MG: 300 TABLET, EXTENDED RELEASE ORAL at 21:07

## 2020-06-11 RX ADMIN — ALPRAZOLAM 0.5 MG: 0.5 TABLET ORAL at 14:43

## 2020-06-11 RX ADMIN — LISINOPRIL 20 MG: 20 TABLET ORAL at 09:19

## 2020-06-11 RX ADMIN — FLUVOXAMINE MALEATE 150 MG: 50 TABLET, COATED ORAL at 21:07

## 2020-06-11 RX ADMIN — LITHIUM CARBONATE 300 MG: 300 TABLET, EXTENDED RELEASE ORAL at 09:19

## 2020-06-11 RX ADMIN — HYDROXYZINE HYDROCHLORIDE 50 MG: 25 TABLET ORAL at 21:07

## 2020-06-11 ASSESSMENT — PAIN SCALES - GENERAL
PAINLEVEL_OUTOF10: 3
PAINLEVEL_OUTOF10: 0

## 2020-06-11 ASSESSMENT — PAIN DESCRIPTION - LOCATION: LOCATION: BACK

## 2020-06-11 NOTE — PLAN OF CARE
Jonathan Hamilton LPN  Outcome: Ongoing  Note: Patient participated in care planning      Problem: Anxiety:  Goal: Level of anxiety will decrease  Description: Level of anxiety will decrease  6/11/2020 0009 by Saddie Koyanagi, RN  Outcome: Ongoing  Note: Patient continues to have periods of increased anxiety. PRN medication given per order for anxiety. 6/10/2020 1851 by Jonathan Hamilton LPN  Outcome: Ongoing  Note: Patient level of anxiety will decrease      Problem: Discharge Planning:  Goal: Discharged to appropriate level of care  Description: Discharged to appropriate level of care  6/11/2020 0009 by Saddie Koyanagi, RN  Outcome: Ongoing  Note: Discharge planning is in process. 6/10/2020 1851 by Jonathan Hamilton LPN  Outcome: Ongoing  Note: Discharge planner working with patient to achieve optimal discharge plan, specific to the needs of this patient. Problem: Altered Mood, Depressive Behavior:  Goal: Patient specific goal  Description: Patient specific goal  6/11/2020 0009 by Saddie Koyanagi, RN  Outcome: Not Met This Shift  Note: Patient did not set a goal for today. 6/10/2020 1851 by Jonathan Hamilton LPN  Outcome: Ongoing  Note: Patient specific goal      Problem: KNOWLEDGE DEFICIT,EDUCATION,DISCHARGE PLAN  Goal: Knowledge - personal safety  6/11/2020 0009 by Saddie Koyanagi, RN  Outcome: Not Met This Shift  Note: Patient has not established a safety plan for discharge,.  6/10/2020 1851 by Jonathan Hamilton LPN  Outcome: Ongoing  Note: Patient did not complete personal safety plan. Problem: Altered Mood, Psychotic Behavior:  Goal: Able to verbalize reality based thinking  Description: Able to verbalize reality based thinking  6/11/2020 0009 by Saddie Koyanagi, RN  Outcome: Completed  Note: No psychosis noted.   6/10/2020 1851 by Jonathan Hamilton LPN  Outcome: Ongoing  Note: Patient able to verbalize reality based thinking

## 2020-06-11 NOTE — PROGRESS NOTES
Patient did not attend goal group today. Patient did not participate in relaxation this shift . Gatito Myers

## 2020-06-11 NOTE — PROGRESS NOTES
Daily Progress Note  Judit Evans MD  6/11/2020    Reviewed patient's current plan of care and vital signs with nursing staff. Sleep:  8 hours last night broken  Attending groups: No  No reported Suicidal thought; Limited interaction with peers & staff. He is still feeling depressed and anxious; he does not feel that he will ever get better. He has significant racing thoughts and inability to focus. His wife would like to have a family meeting. SUBJECTIVE:    Patient is feeling slightly better. SUICIDAL IDEATION denies suicidal ideation. Patient does not have medication side effects. ROS: Patient has new complaints:  No  Sleeping adequately: Yes  Visitors: No  \"I feel a tiny bit better\"    Mental Status Examination:  Patient is cooperative. Speech normal pitch and normal volume. No abnormal movements, tics or mannerisms. Mood anxious and sad, affect Restricted. Suicidal ideation Absent. Homicidal ideations Absent. Hallucinations Absent. Delusions Absent. Thought process obsessive. Alert and oriented X 3. Attention and concentration fair. MEMORY intact. Insight and Judgement impaired insight. Results for Duane Atkins (MRN [id number]) as of 6/11/2020 09:00   Ref. Range 6/10/2020 07:25   Lithium Lvl Latest Ref Range: 0.60 - 1.20 mmol/L 0.42 (L)     Data   height is 6' (1.829 m) and weight is 205 lb (93 kg). His tympanic temperature is 96.1 °F (35.6 °C). His blood pressure is 121/72 and his pulse is 67. His respiration is 16 and oxygen saturation is 97%.         Medications  Current Facility-Administered Medications: fluvoxaMINE (LUVOX) tablet 150 mg, 150 mg, Oral, Nightly  traZODone (DESYREL) tablet 100 mg, 100 mg, Oral, Nightly PRN  lithium (LITHOBID) extended release tablet 300 mg, 300 mg, Oral, 2 times per day  polyethylene glycol (GLYCOLAX) packet 17 g, 17 g, Oral, Daily PRN  acetaminophen (TYLENOL) tablet 650 mg, 650 mg, Oral, Q4H PRN  ibuprofen (ADVIL;MOTRIN) tablet 400 mg, 400 mg, Oral, Q6H PRN  magnesium hydroxide (MILK OF MAGNESIA) 400 MG/5ML suspension 30 mL, 30 mL, Oral, Daily PRN  aluminum & magnesium hydroxide-simethicone (MAALOX) 200-200-20 MG/5ML suspension 30 mL, 30 mL, Oral, Q6H PRN  hydrOXYzine (ATARAX) tablet 50 mg, 50 mg, Oral, TID PRN  ALPRAZolam (XANAX) tablet 0.5 mg, 0.5 mg, Oral, TID PRN  lisinopril (PRINIVIL;ZESTRIL) tablet 20 mg, 20 mg, Oral, Daily    ASSESSMENT  MDD (major depressive disorder), recurrent severe, without psychosis [facility]     PLAN  Patient s symptoms are improving slightly  Continue with current medications. Attempt to develop insight  Psycho-education conducted. Supportive Therapy conducted. Possible family meeting tomorrow morning.

## 2020-06-12 PROCEDURE — 1240000000 HC EMOTIONAL WELLNESS R&B

## 2020-06-12 PROCEDURE — 6370000000 HC RX 637 (ALT 250 FOR IP): Performed by: PSYCHIATRY & NEUROLOGY

## 2020-06-12 RX ADMIN — HYDROXYZINE HYDROCHLORIDE 50 MG: 25 TABLET ORAL at 09:44

## 2020-06-12 RX ADMIN — LITHIUM CARBONATE 300 MG: 300 TABLET, EXTENDED RELEASE ORAL at 21:00

## 2020-06-12 RX ADMIN — TRAZODONE HYDROCHLORIDE 100 MG: 100 TABLET ORAL at 21:00

## 2020-06-12 RX ADMIN — LITHIUM CARBONATE 300 MG: 300 TABLET, EXTENDED RELEASE ORAL at 09:44

## 2020-06-12 RX ADMIN — LISINOPRIL 20 MG: 20 TABLET ORAL at 09:44

## 2020-06-12 RX ADMIN — FLUVOXAMINE MALEATE 150 MG: 50 TABLET, COATED ORAL at 21:00

## 2020-06-12 RX ADMIN — HYDROXYZINE HYDROCHLORIDE 50 MG: 25 TABLET ORAL at 21:00

## 2020-06-12 ASSESSMENT — PAIN SCALES - GENERAL
PAINLEVEL_OUTOF10: 4
PAINLEVEL_OUTOF10: 0

## 2020-06-12 NOTE — PLAN OF CARE
periods of increased anxiety. PRN medication given per order for anxiety. 6/11/2020 1804 by Jose Oshea RN  Outcome: Ongoing  Note: Reports high anxiety. Problem: Activity:  Goal: Sleeping patterns will improve  Description: Sleeping patterns will improve  6/11/2020 2345 by Ingrid Cervantes RN  Outcome: Ongoing  Note: Patient slept 8 hours broken last night per report. 6/11/2020 1804 by Jose Oshea RN  Outcome: Ongoing  Note: Slept 8 hours broken last night. Problem: Discharge Planning:  Goal: Discharged to appropriate level of care  Description: Discharged to appropriate level of care  6/11/2020 2345 by Ingrid Cervantes RN  Outcome: Ongoing  Note: 24 hour chart review completed. 6/11/2020 1804 by Jose Oshea RN  Outcome: Ongoing  Note: Discharge planning in process. Problem: Altered Mood, Depressive Behavior:  Goal: Patient specific goal  Description: Patient specific goal  6/11/2020 2345 by Ingrid Cervantes RN  Outcome: Not Met This Shift  Note: Patient did not set a goal this shift. 6/11/2020 1804 by Joes Oshea RN  Outcome: Ongoing  Note: No goal set. Problem: KNOWLEDGE DEFICIT,EDUCATION,DISCHARGE PLAN  Goal: Knowledge - personal safety  6/11/2020 2345 by Ingrid Cervantes RN  Outcome: Not Met This Shift  Note: Patient has not established a safety plan for discharge. 6/11/2020 1804 by Jose Oshea RN  Outcome: Ongoing  Note: Safety plan not completed this shift. Problem: General Medical Problem-Behavioral Health  Goal: Vital signs within specified parameters  6/11/2020 2345 by Ingrid Cervantes RN  Outcome: Completed  Note: Patient's vitals have been within norms.   6/11/2020 1804 by Jose Oshea RN  Outcome: Ongoing     Problem: Nutrition  Goal: Optimal nutrition therapy  6/11/2020 2345 by Ingrid Cervantes RN  Outcome: Completed  Note: Patient is eating well.  6/11/2020 1804 by Jose Oshea RN  Outcome: Ongoing  Note: Patient eating and drinking well. Problem: Pain:  Description: Pain management should include both nonpharmacologic and pharmacologic interventions.   Goal: Control of acute pain  Description: Control of acute pain  6/11/2020 1804 by Nancie Plunkett RN  Outcome: Completed  Goal: Control of chronic pain  Description: Control of chronic pain  6/11/2020 1804 by Nancie Plunkett RN  Outcome: Completed

## 2020-06-12 NOTE — PROGRESS NOTES
Group Therapy Note    Date: 6/12/2020  Start Time: 1330  End Time: 1500  Number of Participants: 5    Type of Group: Psychotherapy      Notes:  Pt is present for group. Peers shared their experiences with anxiety and depression. Peers also defined what it means to them personally to feel anxiety and depression. Peers conversation encouraged peers to identify early symptoms of those feeling so to intervene when it feels more manageable. Discussed proactive stress management skills with a focus on the here and now. Identified deep breathing and distraction skills. Peers were also introduced to CBT skills. Pt anxiety began to increase to panic attack levels, however pt was able to work his way through it. Status After Intervention:  Improved    Participation Level:  Active Listener and Interactive    Participation Quality: Appropriate, Attentive, Sharing and Supportive      Speech:  normal      Thought Process/Content: Logical  Linear      Affective Functioning: Congruent      Mood: Anxious but improved      Level of consciousness:  Alert, Oriented x4 and Attentive      Response to Learning: Able to verbalize current knowledge/experience, Able to verbalize/acknowledge new learning, Able to retain information, Capable of insight, Able to change behavior and Progressing to goal      Endings: None Reported    Modes of Intervention: Education, Support, Socialization, Exploration, Clarifying, Problem-solving and Activity      Discipline Responsible: /Counselor      Signature:  Bushra Eason

## 2020-06-13 PROCEDURE — 1240000000 HC EMOTIONAL WELLNESS R&B

## 2020-06-13 PROCEDURE — 6370000000 HC RX 637 (ALT 250 FOR IP): Performed by: PSYCHIATRY & NEUROLOGY

## 2020-06-13 RX ORDER — ARIPIPRAZOLE 2 MG/1
2 TABLET ORAL DAILY
Status: DISCONTINUED | OUTPATIENT
Start: 2020-06-13 | End: 2020-06-19 | Stop reason: HOSPADM

## 2020-06-13 RX ADMIN — ALPRAZOLAM 0.5 MG: 0.5 TABLET ORAL at 16:25

## 2020-06-13 RX ADMIN — LITHIUM CARBONATE 300 MG: 300 TABLET, EXTENDED RELEASE ORAL at 20:59

## 2020-06-13 RX ADMIN — TRAZODONE HYDROCHLORIDE 100 MG: 100 TABLET ORAL at 20:59

## 2020-06-13 RX ADMIN — LISINOPRIL 20 MG: 20 TABLET ORAL at 08:17

## 2020-06-13 RX ADMIN — LITHIUM CARBONATE 300 MG: 300 TABLET, EXTENDED RELEASE ORAL at 08:17

## 2020-06-13 RX ADMIN — ALPRAZOLAM 0.5 MG: 0.5 TABLET ORAL at 08:17

## 2020-06-13 RX ADMIN — ARIPIPRAZOLE 2 MG: 2 TABLET ORAL at 11:03

## 2020-06-13 RX ADMIN — HYDROXYZINE HYDROCHLORIDE 50 MG: 25 TABLET ORAL at 20:59

## 2020-06-13 RX ADMIN — FLUVOXAMINE MALEATE 150 MG: 50 TABLET, COATED ORAL at 21:00

## 2020-06-13 ASSESSMENT — PAIN SCALES - GENERAL
PAINLEVEL_OUTOF10: 0
PAINLEVEL_OUTOF10: 0

## 2020-06-13 NOTE — PROGRESS NOTES
Patient did not attend group 1400 psychotherapy group. Max encouragement was provided. One-on-one was offered. Patient refused.      Antolin Milton, SEYMOURW, MSW

## 2020-06-13 NOTE — GROUP NOTE
Group Therapy Note    Date: 6/13/2020    Group Start Time: 1000  Group End Time: 7843  Group Topic: [address] Adult Psych 4E    Agnes Daniel, MARGARITAS        Group Therapy Note    Attendees: 8          Patient's Goal:  To not worry about things. Notes:  Srikanth Romano is noted to require increased verbal prompting to engage in therapeutic conversations. Srikanth Romano reports that he is not sleeping, while in his reports it says that he has slept about 8 hours. When questioned on where his anxiety is coming from he just reports \"everything\" and cannot specify on anything. Status After Intervention:  Unchanged    Participation Level:  Active Listener and Minimal    Participation Quality: Appropriate, Attentive and Sharing      Speech:  normal      Thought Process/Content: Linear      Affective Functioning: Flat      Mood: anxious and depressed      Level of consciousness:  Alert, Oriented x4 and Attentive      Response to Learning: Able to verbalize current knowledge/experience, Able to retain information and Progressing to goal      Endings: None Reported    Modes of Intervention: Education, Support, Socialization, Exploration, Clarifying, Activity and Reality-testing      Discipline Responsible: Psychoeducational Specialist      Signature:  Selena Sahni

## 2020-06-13 NOTE — BH NOTE
Group Therapy Note    Date: 6/12/2020  Start Time: 2000  End Time:  2020  Number of Participants: 1    Type of Group: Wrap-Up    Wellness Binder Information  Module Name:    Session Number:      Patient's Goal:  Think positive    Notes: Working on goal    Status After Intervention:  Unchanged    Participation Level: Active Listener    Participation Quality: Appropriate      Speech:  normal      Thought Process/Content: Logical      Affective Functioning: Congruent      Mood: anxious      Level of consciousness:  Alert      Response to Learning: Able to verbalize current knowledge/experience      Endings: None Reported    Modes of Intervention: Education      Discipline Responsible: Registered Nurse      Signature:   Bucky Leroy RN

## 2020-06-13 NOTE — GROUP NOTE
Group Therapy Note    Date: 6/13/2020    Group Start Time: 8085  Group End Time: 9607  Group Topic: Healthy Living/Wellness    [facility] Adult Psych 4E    Pop Crenshaw LPN        Group Therapy Note    Attendees: 5             Notes:  Did not attend      Discipline Responsible: Licensed Practical Nurse      Signature:  Pop Crenshaw LPN

## 2020-06-14 PROCEDURE — 1240000000 HC EMOTIONAL WELLNESS R&B

## 2020-06-14 PROCEDURE — 6370000000 HC RX 637 (ALT 250 FOR IP): Performed by: PSYCHIATRY & NEUROLOGY

## 2020-06-14 RX ADMIN — LITHIUM CARBONATE 300 MG: 300 TABLET, EXTENDED RELEASE ORAL at 21:23

## 2020-06-14 RX ADMIN — TRAZODONE HYDROCHLORIDE 100 MG: 100 TABLET ORAL at 21:23

## 2020-06-14 RX ADMIN — LISINOPRIL 20 MG: 20 TABLET ORAL at 08:19

## 2020-06-14 RX ADMIN — LITHIUM CARBONATE 300 MG: 300 TABLET, EXTENDED RELEASE ORAL at 08:19

## 2020-06-14 RX ADMIN — ALPRAZOLAM 0.5 MG: 0.5 TABLET ORAL at 08:19

## 2020-06-14 RX ADMIN — ARIPIPRAZOLE 2 MG: 2 TABLET ORAL at 08:19

## 2020-06-14 RX ADMIN — FLUVOXAMINE MALEATE 150 MG: 50 TABLET, COATED ORAL at 21:23

## 2020-06-14 RX ADMIN — HYDROXYZINE HYDROCHLORIDE 50 MG: 25 TABLET ORAL at 21:23

## 2020-06-14 ASSESSMENT — PAIN SCALES - GENERAL
PAINLEVEL_OUTOF10: 0
PAINLEVEL_OUTOF10: 0

## 2020-06-14 NOTE — PLAN OF CARE
Problem: Altered Mood, Depressive Behavior:  Goal: Participates in care planning  Description: Participates in care planning  6/14/2020 1011 by Thomas Barnes LPN  Outcome: Met This Shift  Note: Patient participates in care planning  6/13/2020 2136 by Catrina Kwong RN  Outcome: Ongoing  Note: Pt is taking medications, attending and participating in groups and care planning.  Pt has good interaction with staff and peers      Problem: Discharge Planning:  Goal: Discharged to appropriate level of care  Description: Discharged to appropriate level of care  6/14/2020 1011 by Thomas Barnes LPN  Outcome: Met This Shift  Note: Patient to be discharged to home with wife and follow up with [facility] Jeff [facility] at [facility]  6/13/2020 2136 by Catrina Kwong RN  Outcome: Ongoing  Note: Pt plans to be discharged home and follow with Dr. Jeff Hoffman     Problem: Pain:  Goal: Pain level will decrease  Description: Pain level will decrease  6/14/2020 1011 by Thomas Barnes LPN  Outcome: Met This Shift  Note: Denies pain  6/13/2020 2136 by Catrina Kwong RN  Outcome: Ongoing  Note: Pt denies pain or discomfort     Problem: Altered Mood, Depressive Behavior:  Goal: Able to verbalize and/or display a decrease in depressive symptoms  Description: Able to verbalize and/or display a decrease in depressive symptoms  6/14/2020 1011 by Thomas Barnes LPN  Outcome: Ongoing  Note: Patient verbalized having depressive symptoms, rates mood #4, has flat, depressed, anxious affect, will continue to  monitor  6/13/2020 2136 by Catrina Kwong RN  Outcome: Not Met This Shift  Note: Pt states that he does not feel that his depression or anxiety has improved  Goal: Patient specific goal  Description: Patient specific goal  6/14/2020 1011 by Thomas Barnes LPN  Outcome: Ongoing  Note: Goal:  boost my mood  6/13/2020 2136 by Catrina Kwong RN  Outcome: Ongoing  Note: Pt working on goal of getting rid of anxiety     Problem: Anxiety:  Goal: Level of

## 2020-06-14 NOTE — PROGRESS NOTES
Daily Progress Note  Lani Hale MD  6/14/2020    Reviewed patient's current plan of care and vital signs with nursing staff. Sleep:  7.5 hours last night  Attending groups: yes  No reported Suicidal thought, no crying spells last night or this morning; fair interaction with peers & staff. Mood 4 on a scale of 1 to 10 with 10 is feeling normal. He is slightly better this morning but he worries about having anxiety attack. SUBJECTIVE:    Patient is feeling better. SUICIDAL IDEATION denies suicidal ideation. Patient does not have medication side effects. ROS: Patient has new complaints:  No  Sleeping adequately: Yes  Visitors: No      Mental Status Examination:  Patient is cooperative. Speech normal pitch and normal volume. No abnormal movements, tics or mannerisms. Mood sad, anxious affect Restricted. Suicidal ideation Absent. Homicidal ideations Absent. Hallucinations Absent. Delusions Absent. Thought process obsessive. Alert and oriented X 3. Attention and concentration fair. MEMORY intact. Insight and Judgement impaired insight. Data   height is 6' (1.829 m) and weight is 205 lb (93 kg). His oral temperature is 97.6 °F (36.4 °C). His blood pressure is 122/84 and his pulse is 55. His respiration is 17 and oxygen saturation is 97%.         Medications  Current Facility-Administered Medications: ARIPiprazole (ABILIFY) tablet 2 mg, 2 mg, Oral, Daily  fluvoxaMINE (LUVOX) tablet 150 mg, 150 mg, Oral, Nightly  traZODone (DESYREL) tablet 100 mg, 100 mg, Oral, Nightly PRN  lithium (LITHOBID) extended release tablet 300 mg, 300 mg, Oral, 2 times per day  polyethylene glycol (GLYCOLAX) packet 17 g, 17 g, Oral, Daily PRN  acetaminophen (TYLENOL) tablet 650 mg, 650 mg, Oral, Q4H PRN  ibuprofen (ADVIL;MOTRIN) tablet 400 mg, 400 mg, Oral, Q6H PRN  magnesium hydroxide (MILK OF MAGNESIA) 400 MG/5ML suspension 30 mL, 30 mL, Oral, Daily PRN  aluminum & magnesium hydroxide-simethicone (MAALOX) [phone]

## 2020-06-15 PROCEDURE — 6370000000 HC RX 637 (ALT 250 FOR IP): Performed by: PSYCHIATRY & NEUROLOGY

## 2020-06-15 PROCEDURE — 1240000000 HC EMOTIONAL WELLNESS R&B

## 2020-06-15 RX ADMIN — FLUVOXAMINE MALEATE 150 MG: 50 TABLET, COATED ORAL at 20:51

## 2020-06-15 RX ADMIN — LITHIUM CARBONATE 300 MG: 300 TABLET, EXTENDED RELEASE ORAL at 10:14

## 2020-06-15 RX ADMIN — LITHIUM CARBONATE 300 MG: 300 TABLET, EXTENDED RELEASE ORAL at 20:51

## 2020-06-15 RX ADMIN — ALPRAZOLAM 0.5 MG: 0.5 TABLET ORAL at 10:15

## 2020-06-15 RX ADMIN — ARIPIPRAZOLE 2 MG: 2 TABLET ORAL at 10:14

## 2020-06-15 RX ADMIN — LISINOPRIL 20 MG: 20 TABLET ORAL at 10:14

## 2020-06-15 ASSESSMENT — PAIN SCALES - GENERAL
PAINLEVEL_OUTOF10: 0
PAINLEVEL_OUTOF10: 0

## 2020-06-15 NOTE — PROGRESS NOTES
Group Therapy Note    Date: 6/14/2020  Start Time: 2000  End Time:  2020      Type of Group: Wrap-Up and relaxation      Patient's Goal:  To\" boost my mood\"    Notes:  Progressing toward    Status After Intervention:  Unchanged    Participation Level:  Active Listener    Participation Quality: Appropriate and Attentive      Speech:  normal      Thought Process/Content: Logical      Affective Functioning: Linear    Mood: anxious and depressed      Level of consciousness:  Alert and Oriented x4      Response to Learning: Able to verbalize current knowledge/experience      Endings: None Reported    Modes of Intervention: Support and Socialization      Discipline Responsible: Registered Nurse      Signature:  Aspen Guevara RN

## 2020-06-15 NOTE — PLAN OF CARE
LPN  Outcome: Ongoing  Note: Patient verbalized having depressive symptoms, rates mood #4, has flat, depressed, anxious affect, will continue to  monitor     Problem: Anxiety:  Goal: Level of anxiety will decrease  Description: Level of anxiety will decrease  6/14/2020 2207 by Carrington Melgar RN  Outcome: Ongoing  Note: Patient continues to have periods of increased anxiety. PRN medication given per order for anxiety. 6/14/2020 1011 by Maia Hernandez LPN  Outcome: Ongoing  Note: Medication given for anxiety, see MAR     Problem: Discharge Planning:  Goal: Discharged to appropriate level of care  Description: Discharged to appropriate level of care  6/14/2020 2207 by Carrington Melgar RN  Outcome: Ongoing  Note: Discharge planning is in process. 6/14/2020 1011 by Maia Hernandez LPN  Outcome: Met This Shift  Note: Patient to be discharged to home with wife and follow up with Dr. Joe Kyle at [facility]     Problem: KNOWLEDGE DEFICIT,EDUCATION,DISCHARGE PLAN  Goal: Knowledge - personal safety  6/14/2020 2207 by Carrington Melgar RN  Outcome: Ongoing  Note: Patient is working toward establishing a safety plan for discharge.    6/14/2020 1011 by Maia Hernandez LPN  Outcome: Ongoing  Note: Safety plan to be completed

## 2020-06-15 NOTE — PLAN OF CARE
Patient has attended at least 2 groups today and has been out on the unit this shift for social interaction with others so he has met his socialization goal.

## 2020-06-16 PROCEDURE — 1240000000 HC EMOTIONAL WELLNESS R&B

## 2020-06-16 PROCEDURE — 6370000000 HC RX 637 (ALT 250 FOR IP): Performed by: PSYCHIATRY & NEUROLOGY

## 2020-06-16 RX ADMIN — LITHIUM CARBONATE 300 MG: 300 TABLET, EXTENDED RELEASE ORAL at 08:16

## 2020-06-16 RX ADMIN — LISINOPRIL 20 MG: 20 TABLET ORAL at 08:16

## 2020-06-16 RX ADMIN — LITHIUM CARBONATE 300 MG: 300 TABLET, EXTENDED RELEASE ORAL at 21:12

## 2020-06-16 RX ADMIN — FLUVOXAMINE MALEATE 150 MG: 50 TABLET, COATED ORAL at 21:12

## 2020-06-16 RX ADMIN — ARIPIPRAZOLE 2 MG: 2 TABLET ORAL at 08:16

## 2020-06-16 RX ADMIN — TRAZODONE HYDROCHLORIDE 100 MG: 100 TABLET ORAL at 21:13

## 2020-06-16 ASSESSMENT — PAIN SCALES - GENERAL
PAINLEVEL_OUTOF10: 0
PAINLEVEL_OUTOF10: 0

## 2020-06-16 NOTE — PROGRESS NOTES
Group Therapy Note    Date: 6/16/2020  Start Time: 1330  End Time:  1430  Number of Participants: 7    Type of Group: Psychotherapy      Notes:  Pt is present for group. Attentive with appropriate interaction. Pt shared making slow improvement and is feeling more hopeful. Still has anxiety but no leading to panic attacks. Receptive of peer support. Peers discussed unmet needs, expectations and unrealistic expectations and the relationship to emotional distress. Peers explored effective communication skills as well as boundary setting. Peers also discussed ways in which they may identify unrealistic expectations and modify those beliefs. Status After Intervention:  Unchanged    Participation Level:  Active Listener and Interactive    Participation Quality: Appropriate, Attentive, Sharing and Supportive      Speech:  normal      Thought Process/Content: Logical  Linear       Affective Functioning: Congruent      Mood: Dysphoric, anxious but improving    Level of consciousness:  Alert, Oriented x4 and Attentive      Response to Learning: Able to verbalize current knowledge/experience, Able to verbalize/acknowledge new learning, Able to retain information, Capable of insight, Able to change behavior and Progressing to goal      Endings: None Reported    Modes of Intervention: Education, Support, Socialization, Exploration and Clarifying      Discipline Responsible: /Counselor      Signature:  IDANIA Maya

## 2020-06-16 NOTE — BH NOTE
PLAN OF CARE:     Start Time: 0900  End Time:   0930    Group Topic:  Daily Goals    Group Type:   Goal Group    Intervention/Goal:  To increase support and identify daily goals    Attendance:  Attended group    Affect:   flat    Behavior:  Cooperative and pleasant    Response:  Identified a daily goal.     Daily Goal:   To go to these sessions, feel better and go home to my family.      Progress:  Progressing to goal

## 2020-06-16 NOTE — PROGRESS NOTES
Daily Progress Note  Lani Hale MD  6/16/2020    Reviewed patient's current plan of care and vital signs with nursing staff. Sleep:  8 hours last night  Attending groups: yes  No reported Suicidal thought. Mood  5 on a scale of 1 to 10 with 10 is feeling normal.  He denies feeling anxious but is still feeling depressed. SUBJECTIVE:    Patient is feeling better. SUICIDAL IDEATION denies suicidal ideation. Patient does not have medication side effects. ROS: Patient has new complaints:  No  Sleeping adequately: Yes  Visitors: No    Mental Status Examination:  Patient is cooperative. Speech normal pitch and normal volume. No abnormal movements, tics or mannerisms. Mood dysthymic affect Restricted. Suicidal ideation Absent. Homicidal ideations Absent. Hallucinations Absent. Delusions Absent. Thought process obsessive. Alert and oriented X 3. Attention and concentration fair. MEMORY intact. Insight and Judgement impaired insight. Data   height is 6' (1.829 m) and weight is 205 lb (93 kg). His tympanic temperature is 96.4 °F (35.8 °C). His blood pressure is 125/72 and his pulse is 74. His respiration is 16 and oxygen saturation is 98%.         Medications  Current Facility-Administered Medications: ARIPiprazole (ABILIFY) tablet 2 mg, 2 mg, Oral, Daily  fluvoxaMINE (LUVOX) tablet 150 mg, 150 mg, Oral, Nightly  traZODone (DESYREL) tablet 100 mg, 100 mg, Oral, Nightly PRN  lithium (LITHOBID) extended release tablet 300 mg, 300 mg, Oral, 2 times per day  polyethylene glycol (GLYCOLAX) packet 17 g, 17 g, Oral, Daily PRN  acetaminophen (TYLENOL) tablet 650 mg, 650 mg, Oral, Q4H PRN  ibuprofen (ADVIL;MOTRIN) tablet 400 mg, 400 mg, Oral, Q6H PRN  magnesium hydroxide (MILK OF MAGNESIA) 400 MG/5ML suspension 30 mL, 30 mL, Oral, Daily PRN  aluminum & magnesium hydroxide-simethicone (MAALOX) 200-200-20 MG/5ML suspension 30 mL, 30 mL, Oral, Q6H PRN  hydrOXYzine (ATARAX) tablet 50 mg, 50 mg, Oral, TID

## 2020-06-16 NOTE — PLAN OF CARE
Problem: Altered Mood, Depressive Behavior:  Goal: Participates in care planning  Description: Participates in care planning  6/16/2020 1051 by Michael Fuentes LPN  Outcome: Met This Shift  Note: Patient participates inn care planning  6/15/2020 2355 by Maria Eugenia Cunningham RN  Outcome: Met This Shift  Note: Care plan reviewed with patient. Patient does verbalize understanding of the plan of care and does contribute to goal setting . Problem: Discharge Planning:  Goal: Discharged to appropriate level of care  Description: Discharged to appropriate level of care  6/16/2020 1051 by Michael Fuentes LPN  Outcome: Met This Shift  Note: Patient to be discharged to home with wife and follow up with Dr. Jagdeep Verma at [facility]  6/15/2020 2355 by Maria Eugenia Cunningham RN  Outcome: Ongoing  Note: Discharge planning is in process. Problem: Pain:  Goal: Pain level will decrease  Description: Pain level will decrease  6/16/2020 1051 by Michael Fuentes LPN  Outcome: Met This Shift  Note: Denies pain  6/15/2020 2355 by Maria Eugenia Cunningham RN  Outcome: Met This Shift  Note: No complaints of pain this shift noted. Problem: Altered Mood, Depressive Behavior:  Goal: Able to verbalize and/or display a decrease in depressive symptoms  Description: Able to verbalize and/or display a decrease in depressive symptoms  6/16/2020 1051 by Michael Fuentes LPN  Outcome: Ongoing  Note: Patient verbalizes having depressive symptoms but is improving, rates mood #5, has flat affect, will continue to monitor  6/15/2020 2355 by Maria Eugenia Cunningham RN  Outcome: Ongoing  Note: Patient reports that he is slowly getting better and then adds \"I hope\". Patient spends time out on the unit interacting well with a male peer who is also hiss room mate. The patient reports that he continues to feel hopeful in regards to a positive outcome of his hospitalization.   Goal: Patient specific goal  Description: Patient specific goal  6/16/2020 1051 by Sanjeev Galindo Constanza Hodges LPN  Outcome: Ongoing  Note: Goal:  go to groups  6/15/2020 2355 by Greg Gu RN  Outcome: Met This Shift  Note: Patient set a goal to feel better and to \"snap out of ut\". Problem: Anxiety:  Goal: Level of anxiety will decrease  Description: Level of anxiety will decrease  6/16/2020 1051 by Brenda Thompson LPN  Outcome: Ongoing  Note: Denies anxiety at this time, will monitor  6/15/2020 2355 by Greg Gu RN  Outcome: Ongoing  Note: Patient denies feeling anxious this shift. Problem: Activity:  Goal: Sleeping patterns will improve  Description: Sleeping patterns will improve  6/16/2020 1051 by Brenda Thompson LPN  Outcome: Ongoing  Note: Patient slept 8 hours during the night, will continue to monitor  6/15/2020 2355 by Greg Gu RN  Outcome: Met This Shift  Note: Patient slept8 continuous hours last night per report. Problem: KNOWLEDGE DEFICIT,EDUCATION,DISCHARGE PLAN  Goal: Knowledge - personal safety  6/16/2020 1051 by Brenda Thompson LPN  Outcome: Ongoing  Note: Safety plan to be completed  6/15/2020 2355 by Greg Gu RN  Outcome: Ongoing  Note: Patient is working toward establishing a safety plan for discharge. Care plan reviewed with patient . Patient verbalizes understanding of the plan of care and contributes to goal setting.

## 2020-06-16 NOTE — PLAN OF CARE
Patient spends time out on the unit interacting well with a male peer who is also hiss room mate. The patient reports that he continues to feel hopeful in regards to a positive outcome of his hospitalization. 6/15/2020 1753 by Anna Carver LPN  Outcome: Ongoing  Note: Patient reports depressive symptoms during shift assessment. Patient rates depression 7/10. Problem: Anxiety:  Goal: Level of anxiety will decrease  Description: Level of anxiety will decrease  6/15/2020 2355 by Reza Cantu RN  Outcome: Ongoing  Note: Patient denies feeling anxious this shift. 6/15/2020 1753 by Anna Carver LPN  Outcome: Ongoing  Note: Patient reports anxiety during shift assessment. PRN xanax given with AM med pass. Patient rates anxiety 5/10. Problem: Discharge Planning:  Goal: Discharged to appropriate level of care  Description: Discharged to appropriate level of care  6/15/2020 2355 by Reza Cantu RN  Outcome: Ongoing  Note: Discharge planning is in process. 6/15/2020 1753 by Anna Carver LPN  Outcome: Ongoing  Note: No discharge plans noted at this time during shift. Problem: KNOWLEDGE DEFICIT,EDUCATION,DISCHARGE PLAN  Goal: Knowledge - personal safety  6/15/2020 2355 by Reza Cantu RN  Outcome: Ongoing  Note: Patient is working toward establishing a safety plan for discharge. 6/15/2020 1753 by Anna Carver LPN  Outcome: Ongoing  Note: Patient did not complete safety plan at this time during shift.

## 2020-06-16 NOTE — PLAN OF CARE
Patient has attended at least 2 groups this shift and has also been out of his room for socialization with others today so he has met his socialization goal.

## 2020-06-17 PROCEDURE — 6370000000 HC RX 637 (ALT 250 FOR IP): Performed by: PSYCHIATRY & NEUROLOGY

## 2020-06-17 PROCEDURE — 1240000000 HC EMOTIONAL WELLNESS R&B

## 2020-06-17 RX ADMIN — ALPRAZOLAM 0.5 MG: 0.5 TABLET ORAL at 21:57

## 2020-06-17 RX ADMIN — LITHIUM CARBONATE 300 MG: 300 TABLET, EXTENDED RELEASE ORAL at 08:42

## 2020-06-17 RX ADMIN — LITHIUM CARBONATE 300 MG: 300 TABLET, EXTENDED RELEASE ORAL at 21:56

## 2020-06-17 RX ADMIN — FLUVOXAMINE MALEATE 150 MG: 50 TABLET, COATED ORAL at 21:56

## 2020-06-17 RX ADMIN — ARIPIPRAZOLE 2 MG: 2 TABLET ORAL at 08:42

## 2020-06-17 RX ADMIN — TRAZODONE HYDROCHLORIDE 100 MG: 100 TABLET ORAL at 21:57

## 2020-06-17 ASSESSMENT — PAIN SCALES - GENERAL
PAINLEVEL_OUTOF10: 0
PAINLEVEL_OUTOF10: 0

## 2020-06-17 NOTE — PROGRESS NOTES
Daily Progress Note  Lani Hale MD  6/17/2020    Reviewed patient's current plan of care and vital signs with nursing staff. Sleep:  7.5 hours last night  Attending groups: yes  No reported Suicidal thought. Mood  5 on a scale of 1 to 10 with 10 is feeling normal.  Today is his 42nd wedding anniversary. He had a low heart rate this morning at 44 and he felt \"lazy this morning\"     SUBJECTIVE:    Patient is feeling better. SUICIDAL IDEATION denies suicidal ideation. Patient does not have medication side effects. ROS: Patient has new complaints:  No  Sleeping adequately: Yes  Visitors: No    Mental Status Examination:  Patient is cooperative. Speech normal pitch and normal volume. No abnormal movements, tics or mannerisms. Mood dysthymic affect Restricted. Suicidal ideation Absent. Homicidal ideations Absent. Hallucinations Absent. Delusions Absent. Thought process goal oriented. Alert and oriented X 3. Attention and concentration fair. MEMORY intact. Insight and Judgement fair. Data   height is 6' (1.829 m) and weight is 205 lb (93 kg). His oral temperature is 96.9 °F (36.1 °C). His blood pressure is 121/67 and his pulse is 42 (abnormal). His respiration is 16 and oxygen saturation is 97%.         Medications  Current Facility-Administered Medications: ARIPiprazole (ABILIFY) tablet 2 mg, 2 mg, Oral, Daily  fluvoxaMINE (LUVOX) tablet 150 mg, 150 mg, Oral, Nightly  traZODone (DESYREL) tablet 100 mg, 100 mg, Oral, Nightly PRN  lithium (LITHOBID) extended release tablet 300 mg, 300 mg, Oral, 2 times per day  polyethylene glycol (GLYCOLAX) packet 17 g, 17 g, Oral, Daily PRN  acetaminophen (TYLENOL) tablet 650 mg, 650 mg, Oral, Q4H PRN  ibuprofen (ADVIL;MOTRIN) tablet 400 mg, 400 mg, Oral, Q6H PRN  magnesium hydroxide (MILK OF MAGNESIA) 400 MG/5ML suspension 30 mL, 30 mL, Oral, Daily PRN  aluminum & magnesium hydroxide-simethicone (MAALOX) 200-200-20 MG/5ML suspension 30 mL, 30 mL, Oral, Q6H PRN  hydrOXYzine (ATARAX) tablet 50 mg, 50 mg, Oral, TID PRN  ALPRAZolam (XANAX) tablet 0.5 mg, 0.5 mg, Oral, TID PRN  lisinopril (PRINIVIL;ZESTRIL) tablet 20 mg, 20 mg, Oral, Daily    ASSESSMENT  MDD (major depressive disorder), recurrent severe, without psychosis ([facility]     PLAN  Patient s symptoms are improving  Continue with current medications. Attempt to develop insight  Psycho-education conducted. Supportive Therapy conducted.

## 2020-06-17 NOTE — GROUP NOTE
Group Therapy Note    Group Start Time: 1630  Group End Time: 1700  Group Topic: Healthy Living/Wellness    Attendees: 9    Notes: Patient did not complete safety plan.     Discipline Responsible: Licensed Practical Nurse    Signature:  Usama Bello LPN

## 2020-06-18 LAB — LITHIUM LEVEL: 0.3 MMOL/L (ref 0.6–1.2)

## 2020-06-18 PROCEDURE — 36415 COLL VENOUS BLD VENIPUNCTURE: CPT

## 2020-06-18 PROCEDURE — 80178 ASSAY OF LITHIUM: CPT

## 2020-06-18 PROCEDURE — 6370000000 HC RX 637 (ALT 250 FOR IP): Performed by: PSYCHIATRY & NEUROLOGY

## 2020-06-18 PROCEDURE — 1240000000 HC EMOTIONAL WELLNESS R&B

## 2020-06-18 RX ADMIN — TRAZODONE HYDROCHLORIDE 100 MG: 100 TABLET ORAL at 21:50

## 2020-06-18 RX ADMIN — FLUVOXAMINE MALEATE 150 MG: 50 TABLET, COATED ORAL at 21:49

## 2020-06-18 RX ADMIN — LITHIUM CARBONATE 300 MG: 300 TABLET, EXTENDED RELEASE ORAL at 21:49

## 2020-06-18 RX ADMIN — LISINOPRIL 20 MG: 20 TABLET ORAL at 10:09

## 2020-06-18 RX ADMIN — LITHIUM CARBONATE 300 MG: 300 TABLET, EXTENDED RELEASE ORAL at 13:08

## 2020-06-18 RX ADMIN — ARIPIPRAZOLE 2 MG: 2 TABLET ORAL at 10:09

## 2020-06-18 RX ADMIN — ALPRAZOLAM 0.5 MG: 0.5 TABLET ORAL at 21:52

## 2020-06-18 ASSESSMENT — PAIN SCALES - GENERAL
PAINLEVEL_OUTOF10: 0
PAINLEVEL_OUTOF10: 0

## 2020-06-18 NOTE — PROGRESS NOTES
Group Therapy Note    Date: 6/18/2020  Start Time: 1330  End Time:  1430  Number of Participants: 10    Type of Group: Psychotherapy      Notes:  Pt is present for group. Peers introduced self to new group members and shared the events and experiences related to this admission. Peers also identified examples of their own self defeating beliefs which reinforce false stereo types contributing to their own resistance to seeking help which they know that they are in need of. Identified daily examples of how we rely on others directly and indirectly and the positive outcome of those activities. Status After Intervention:  Improved    Participation Level: Active Listener and Interactive    Participation Quality: Appropriate, Attentive, Sharing and Supportive      Speech:  normal      Thought Process/Content: Logical  Linear      Affective Functioning: Congruent      Mood: Dysphoric but improving.        Level of consciousness:  Alert, Oriented x4 and Attentive      Response to Learning: Able to verbalize current knowledge/experience, Able to verbalize/acknowledge new learning, Able to retain information, Capable of insight, Able to change behavior and Progressing to goal      Endings: None Reported    Modes of Intervention: Education, Support, Socialization, Exploration, Clarifying and Activity      Discipline Responsible: /Counselor      Signature:  IDANIA Silva

## 2020-06-18 NOTE — PLAN OF CARE
Problem: Altered Mood, Depressive Behavior:  Goal: Able to verbalize and/or display a decrease in depressive symptoms  Description: Able to verbalize and/or display a decrease in depressive symptoms  Outcome: Ongoing  Note: Rates his mood 7 out of 10 with 10 being the best mood. Reports feeling anxious and depressed. Goal: Patient specific goal  Description: Patient specific goal  Outcome: Ongoing  Goal: Participates in care planning  Description: Participates in care planning  Outcome: Ongoing  Note: Care plan reviewed with patient. Patient does verbalize understanding of the plan of care and does contribute to goal setting        Problem: Anxiety:  Goal: Level of anxiety will decrease  Description: Level of anxiety will decrease  Outcome: Ongoing  Note: Reports feeling anxious. Compulsive questions about his medications and discharge planning. Problem: Discharge Planning:  Goal: Discharged to appropriate level of care  Description: Discharged to appropriate level of care  Outcome: Ongoing  Note: Discharge planning in process. Problem: Pain:  Goal: Pain level will decrease  Description: Pain level will decrease  Outcome: Ongoing  Note: Denies any pain at this time.

## 2020-06-18 NOTE — FLOWSHEET NOTE
06/18/20 1323   Encounter Summary   Services provided to: Patient   Referral/Consult From: Other    Support System Spouse; Children   Place of Sikhism Jehovah's witness   Continue Visiting Yes  (6/18 P)   Complexity of Encounter Moderate   Length of Encounter 15 minutes   Spiritual/Congregational   Type Spiritual support   Assessment Approachable;Calm; Hopeful   Intervention Prayer;Nurtured hope; Active listening;Communion;Empowerment;Sustaining presence/ Ministry of presence   Outcome Connection/belonging;Expressed gratitude;Encouraged; Hopeful;Receptive

## 2020-06-18 NOTE — PLAN OF CARE
Problem: Altered Mood, Depressive Behavior:  Goal: Able to verbalize and/or display a decrease in depressive symptoms  Description: Able to verbalize and/or display a decrease in depressive symptoms  6/18/2020 0031 by Maliha Solis RN  Outcome: Ongoing  Note: Patient reports some depression, rates mood at a 5-6.  6/17/2020 1244 by Aiyana Thomason RN  Outcome: Ongoing  Note: Pt states that he is working on his depression, feels some shame and does not understand why he can't just get better, educated patient  Goal: Patient specific goal  Description: Patient specific goal  6/18/2020 0031 by Maliha Solis RN  Outcome: Ongoing  Note: Daily goal met per patient. 6/17/2020 1244 by Aiyana Thomason RN  Outcome: Ongoing  Note: To continue on the path to recovery and to go home - ongoing  Goal: Participates in care planning  Description: Participates in care planning  6/18/2020 0031 by Maliha Solis RN  Outcome: Ongoing  Note: Patient participated this shift. 6/17/2020 1244 by Aiyana Thomason RN  Outcome: Met This Shift  Note: This patient participates in care planning      Problem: Anxiety:  Goal: Level of anxiety will decrease  Description: Level of anxiety will decrease  6/18/2020 0031 by Maliha Solis RN  Outcome: Ongoing  Note: Patient denies anxiety, however is preoccupied and has compulsive thoughts. Patient also appears anxious and requested xanax this shift. 6/17/2020 1244 by Aiyana Thomason RN  Outcome: Ongoing  Note: Patient reports some anxiety. Pt taking nothing prn at this time, will continue to monitor     Problem: Discharge Planning:  Goal: Discharged to appropriate level of care  Description: Discharged to appropriate level of care  6/18/2020 0031 by Maliha Solis RN  Outcome: Ongoing  Note: Discharge planning is in progress.    6/17/2020 1244 by Aiyana Thomason RN  Outcome: Not Met This Shift  Note: Discharge planners working with patient to achieve optimal discharge plan, specific to the needs of

## 2020-06-18 NOTE — PROGRESS NOTES
[place] [address] PROGRESS NOTE      Patient: Flex Bryant  Room #: 1B-55/834-R            YOB: 1950  Age: 81 y.o. Gender: male            Admit Date & Time: 6/5/2020  3:18 PM    Assessment:  Pt, a 81year old male was in the common room eating at the time of the visit. He was dealing with major depressive disorder (recurrent severe without psychosis. He reported that same problem happened to him 35 years ago and has been fine since then until now. He will be going home tomorrow, he said. Interventions: Active listening. Words of encouragement and communion offered. Outcomes:  Pt expressed gratitude and was hopeful. Plan:  Continued spiritual support.        Electronically signed by Ni  on 6/18/2020 at 1:25 PM.  Javi Wilkinson  [phone]

## 2020-06-18 NOTE — PROGRESS NOTES
PRN  hydrOXYzine (ATARAX) tablet 50 mg, 50 mg, Oral, TID PRN  ALPRAZolam (XANAX) tablet 0.5 mg, 0.5 mg, Oral, TID PRN  lisinopril (PRINIVIL;ZESTRIL) tablet 20 mg, 20 mg, Oral, Daily    ASSESSMENT  MDD (major depressive disorder), recurrent severe, without psychosis ([facility]     PLAN  Patient s symptoms are improving  Continue with current medications. Attempt to develop insight  Psycho-education conducted. Supportive Therapy conducted.   Probable discharge is tomorrow  Follow-up @ [address] [place] [address].    Patient Location:  [id number]     Provider Location ([facility]/State):   [address] [place] [address]

## 2020-06-19 VITALS
OXYGEN SATURATION: 96 % | HEART RATE: 66 BPM | SYSTOLIC BLOOD PRESSURE: 122 MMHG | DIASTOLIC BLOOD PRESSURE: 66 MMHG | WEIGHT: 205 LBS | HEIGHT: 72 IN | BODY MASS INDEX: 27.77 KG/M2 | TEMPERATURE: 96.7 F | RESPIRATION RATE: 18 BRPM

## 2020-06-19 PROCEDURE — 5130000000 HC BRIDGE APPOINTMENT

## 2020-06-19 PROCEDURE — 6370000000 HC RX 637 (ALT 250 FOR IP): Performed by: PSYCHIATRY & NEUROLOGY

## 2020-06-19 RX ORDER — LITHIUM CARBONATE 300 MG/1
300 TABLET, FILM COATED, EXTENDED RELEASE ORAL EVERY 12 HOURS SCHEDULED
Qty: 60 TABLET | Refills: 0 | Status: SHIPPED | OUTPATIENT
Start: 2020-06-19

## 2020-06-19 RX ORDER — FLUVOXAMINE MALEATE 50 MG/1
150 TABLET, COATED ORAL NIGHTLY
Qty: 30 TABLET | Refills: 0 | Status: SHIPPED | OUTPATIENT
Start: 2020-06-19

## 2020-06-19 RX ORDER — ALPRAZOLAM 0.5 MG/1
0.5 TABLET ORAL 3 TIMES DAILY PRN
Qty: 80 TABLET | Refills: 0 | Status: SHIPPED | OUTPATIENT
Start: 2020-06-19 | End: 2020-07-19

## 2020-06-19 RX ORDER — ARIPIPRAZOLE 2 MG/1
2 TABLET ORAL DAILY
Qty: 30 TABLET | Refills: 0 | Status: SHIPPED | OUTPATIENT
Start: 2020-06-19

## 2020-06-19 RX ORDER — TRAZODONE HYDROCHLORIDE 100 MG/1
100 TABLET ORAL NIGHTLY PRN
Qty: 30 TABLET | Refills: 0 | Status: SHIPPED | OUTPATIENT
Start: 2020-06-19

## 2020-06-19 RX ADMIN — ARIPIPRAZOLE 2 MG: 2 TABLET ORAL at 09:12

## 2020-06-19 RX ADMIN — LISINOPRIL 20 MG: 20 TABLET ORAL at 09:12

## 2020-06-19 RX ADMIN — ALPRAZOLAM 0.5 MG: 0.5 TABLET ORAL at 11:09

## 2020-06-19 RX ADMIN — LITHIUM CARBONATE 300 MG: 300 TABLET, EXTENDED RELEASE ORAL at 09:12

## 2020-06-19 ASSESSMENT — PAIN SCALES - GENERAL: PAINLEVEL_OUTOF10: 0

## 2020-06-19 NOTE — PROGRESS NOTES
Daily Progress Note  Kim Stafford MD  6/19/2020    Reviewed patient's current plan of care and vital signs with nursing staff. Sleep:  7.5 hours last night  Attending groups: yes  No reported Suicidal thought. Mood 7 today on a scale of 1 to 10 with 10 is feeling normal.      SUBJECTIVE:    Patient is feeling better. SUICIDAL IDEATION denies suicidal ideation. Patient does not have medication side effects. ROS: Patient has new complaints:  No  Sleeping adequately: Yes  Visitors: No    Mental Status Examination:  Patient is cooperative. Speech normal pitch and normal volume. No abnormal movements, tics or mannerisms. Mood dysthymic affect Restricted. Suicidal ideation Absent. Homicidal ideations Absent. Hallucinations Absent. Delusions Absent. Thought process goal oriented. Alert and oriented X 3. Attention and concentration fair. MEMORY intact. Insight and Judgement fair. Data   height is 6' (1.829 m) and weight is 205 lb (93 kg). His oral temperature is 96.3 °F (35.7 °C). His blood pressure is 123/77 and his pulse is 71. His respiration is 18 and oxygen saturation is 96%. Results for Beverly Overton (MRN [id number]) as of 6/19/2020 07:51   Ref.  Range 6/18/2020 10:45   Lithium Lvl Latest Ref Range: 0.60 - 1.20 mmol/L 0.30 (L)        Medications  Current Facility-Administered Medications: ARIPiprazole (ABILIFY) tablet 2 mg, 2 mg, Oral, Daily  fluvoxaMINE (LUVOX) tablet 150 mg, 150 mg, Oral, Nightly  traZODone (DESYREL) tablet 100 mg, 100 mg, Oral, Nightly PRN  lithium (LITHOBID) extended release tablet 300 mg, 300 mg, Oral, 2 times per day  polyethylene glycol (GLYCOLAX) packet 17 g, 17 g, Oral, Daily PRN  acetaminophen (TYLENOL) tablet 650 mg, 650 mg, Oral, Q4H PRN  ibuprofen (ADVIL;MOTRIN) tablet 400 mg, 400 mg, Oral, Q6H PRN  magnesium hydroxide (MILK OF MAGNESIA) 400 MG/5ML suspension 30 mL, 30 mL, Oral, Daily PRN  aluminum & magnesium hydroxide-simethicone (MAALOX) 234-834-01 MG/5ML
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
## 2020-06-19 NOTE — PLAN OF CARE
6/18/2020 1540 by Gisela Garner RN  Outcome: Ongoing  Note: Discharge planning in process. Problem: Pain:  Goal: Pain level will decrease  Description: Pain level will decrease  6/19/2020 0117 by Miguel Cantu RN  Outcome: Ongoing  Note: Patient denies pain. 6/18/2020 1540 by Gisela Garner RN  Outcome: Ongoing  Note: Denies any pain at this time. Care plan reviewed with patient.   Patient does verbalize understanding of the plan of care and does contribute to goal setting

## 2020-06-22 ENCOUNTER — TELEPHONE (OUTPATIENT)
Dept: PSYCHIATRY | Age: 70
End: 2020-06-22

## 2020-06-24 NOTE — BH NOTE
Received call from [employer].  is requesting discharge information before his follow up appointment to be faxed to [phone], Attn: Lula Maxwell. H&P, Dr. Muñoz American most recent progress note, and recent medication list. This information printed and faxed by this writer.